# Patient Record
Sex: FEMALE | Race: WHITE
[De-identification: names, ages, dates, MRNs, and addresses within clinical notes are randomized per-mention and may not be internally consistent; named-entity substitution may affect disease eponyms.]

---

## 2017-01-25 ENCOUNTER — HOSPITAL ENCOUNTER (EMERGENCY)
Dept: HOSPITAL 74 - JER | Age: 62
Discharge: HOME | End: 2017-01-25
Payer: COMMERCIAL

## 2017-01-25 VITALS — DIASTOLIC BLOOD PRESSURE: 80 MMHG | TEMPERATURE: 98 F | SYSTOLIC BLOOD PRESSURE: 140 MMHG

## 2017-01-25 VITALS — HEART RATE: 81 BPM

## 2017-01-25 VITALS — BODY MASS INDEX: 24.7 KG/M2

## 2017-01-25 DIAGNOSIS — E78.00: ICD-10-CM

## 2017-01-25 DIAGNOSIS — I10: ICD-10-CM

## 2017-01-25 DIAGNOSIS — J45.909: ICD-10-CM

## 2017-01-25 DIAGNOSIS — N20.0: Primary | ICD-10-CM

## 2017-01-25 DIAGNOSIS — Z87.442: ICD-10-CM

## 2017-01-25 DIAGNOSIS — K52.9: ICD-10-CM

## 2017-01-25 LAB
ALBUMIN SERPL-MCNC: 3.8 G/DL (ref 3.4–5)
ALP SERPL-CCNC: 111 U/L (ref 45–117)
ALT SERPL-CCNC: 117 U/L (ref 12–78)
ANION GAP SERPL CALC-SCNC: 7 MMOL/L (ref 8–16)
APPEARANCE UR: CLEAR
AST SERPL-CCNC: 109 U/L (ref 15–37)
BACTERIA #/AREA URNS HPF: (no result) /HPF
BASOPHILS # BLD: 0.8 % (ref 0–2)
BILIRUB SERPL-MCNC: 0.5 MG/DL (ref 0.2–1)
BILIRUB UR STRIP.AUTO-MCNC: NEGATIVE MG/DL
CALCIUM SERPL-MCNC: 9.2 MG/DL (ref 8.5–10.1)
CO2 SERPL-SCNC: 28 MMOL/L (ref 21–32)
COLOR UR: (no result)
CREAT SERPL-MCNC: 0.7 MG/DL (ref 0.55–1.02)
DEPRECATED RDW RBC AUTO: 13.5 % (ref 11.6–15.6)
EOSINOPHIL # BLD: 1.5 % (ref 0–4.5)
GLUCOSE SERPL-MCNC: 86 MG/DL (ref 74–106)
KETONES UR QL STRIP: NEGATIVE
LEUKOCYTE ESTERASE UR QL STRIP.AUTO: (no result)
MCH RBC QN AUTO: 26.8 PG (ref 25.7–33.7)
MCHC RBC AUTO-ENTMCNC: 32.8 G/DL (ref 32–36)
MCV RBC: 81.9 FL (ref 80–96)
NEUTROPHILS # BLD: 73 % (ref 42.8–82.8)
NITRITE UR QL STRIP: NEGATIVE
PH UR: 7 [PH] (ref 5–8)
PLATELET # BLD AUTO: 242 K/MM3 (ref 134–434)
PMV BLD: 9 FL (ref 7.5–11.1)
PROT SERPL-MCNC: 7.5 G/DL (ref 6.4–8.2)
PROT UR QL STRIP: NEGATIVE
PROT UR QL STRIP: NEGATIVE
RBC # BLD AUTO: 2 /HPF (ref 0–3)
RBC # UR STRIP: (no result) /UL
SP GR UR: 1 (ref 1–1.03)
UROBILINOGEN UR STRIP-MCNC: NEGATIVE E.U./DL (ref 0.2–1)
WBC # BLD AUTO: 9.3 K/MM3 (ref 4–10)
WBC # UR AUTO: 201 /HPF (ref 3–5)

## 2017-01-25 PROCEDURE — 3E0337Z INTRODUCTION OF ELECTROLYTIC AND WATER BALANCE SUBSTANCE INTO PERIPHERAL VEIN, PERCUTANEOUS APPROACH: ICD-10-PCS

## 2017-01-25 PROCEDURE — 3E03329 INTRODUCTION OF OTHER ANTI-INFECTIVE INTO PERIPHERAL VEIN, PERCUTANEOUS APPROACH: ICD-10-PCS

## 2017-01-25 PROCEDURE — 3E0333Z INTRODUCTION OF ANTI-INFLAMMATORY INTO PERIPHERAL VEIN, PERCUTANEOUS APPROACH: ICD-10-PCS

## 2017-01-25 PROCEDURE — 3E033NZ INTRODUCTION OF ANALGESICS, HYPNOTICS, SEDATIVES INTO PERIPHERAL VEIN, PERCUTANEOUS APPROACH: ICD-10-PCS

## 2017-01-25 NOTE — PDOC
History of Present Illness





- General


History Source: Patient, Old Records


Exam Limitations: No Limitations





- History of Present Illness


Initial Comments: 


01/25/17 11:31


The patient is a 61-year-old woman, with a significant past medical history of 

hypertension, hypercholesterolemia, gastritis, recurrent urinary tract 

infections, kidney stones (recent outpatient CT scan showed bilateral kidney 

stones (12/22/2016) and asthma who presents to the emergency department for 

further evaluation of right flank pain. No trauma. No recent strenuous 

activity. She reports she was in her usual state of health when she was at home 

drinking water, coincidentally performing a home 24 hour urine sample when she 

suddenly experienced right flank pain. She states that her pain has been 

constant since onset, and has intensified throughout onset (8/10) in severity. 

She reports associated symptoms of dysuria, described as burning sensations, 

urinary frequency(output of a few drops) and hematuria (no clots noted). She 

admits to a history of urinary tract infections and kidney stones s/p kidney 

stone removal in (1986) and reports her symptoms are consistent with her 

typical UTIs. She took a Tylenol 3 tablet this morning, without much relief.





She denies fever, chills, diaphoresis, generalized weakness.


She denies chest pain, shortness of breath, cough


She denies abdominal pain, nausea, vomiting, diarrhea, vaginal discharge/

vaginal bleeding





Allergies: Morphine. Apples Carrots. Strawberries. 


Past Surgical History: Appendectomy. CHolecystectomy. Hernia repair. 


Social History: No tobacco, ETOH and recreational drug use. 


Primary Care Physician: Christopher Fuller [phone] 


Urologist: Jose Paniagua  [phone]





<Jaymie Choe - Last Filed: 01/25/17 14:01>





<Ryan Tinajero - Last Filed: 01/25/17 14:27>





- General


Chief Complaint: Pain


Stated Complaint: LOWER PELVIC PAIN, BLOOD IN URINE


Time Seen by Provider: 01/25/17 11:00





Past History





<Jaymie Choe - Last Filed: 01/25/17 14:01>





- Past Medical History


Anemia: No


Asthma: Yes


Cancer: No


Cardiac Disorders: Yes


CVA: No


COPD: No


CHF: No


Dementia: No


Diabetes: No


GI Disorders: Yes (gastritis)


 Disorders: Yes (X5 stones)


HTN: Yes


Hypercholesterolemia: Yes


Kidney Stones: Yes


Suicide Attempt (Hx): No


Seizures: No


Thyroid Disease: Yes





- Surgical History


Abdominal Surgery: Yes (HERNIA)


Appendectomy: Yes


Cardiac Surgery: No


Cholecystectomy: Yes


Lung Surgery: No


Neurologic Surgery: No


Orthopedic Surgery: No





- Reproductive History


Cervical CA: No


Dysfunctional Uterine Bleeding: No


Ectopic Pregnancy: No


Endometrial CA: No


Polycystic Ovaries: No


Tubal Ligation: No





- Psycho/Social/Smoking Cessation Hx


Anxiety: No


Suicidal Ideation: No


Smoking History: Never smoked


Have you smoked in the past 12 months: No


Hx Alcohol Use: No


Drug/Substance Use Hx: No


Substance Use Type: None





<Ryan Tinajero - Last Filed: 01/25/17 14:27>





- Past Medical History


Allergies/Adverse Reactions: 


 Allergies











Allergy/AdvReac Type Severity Reaction Status Date / Time


 


morphine Allergy Intermediate Itching Verified 01/25/17 10:45


 


APPLES Allergy Severe  Uncoded 01/25/17 10:45


 


CARROTS Allergy Severe  Uncoded 01/25/17 10:45


 


STRAWBERRIES Allergy Severe  Uncoded 01/25/17 10:45











Home Medications: 


Ambulatory Orders





Duloxetine HCl [Cymbalta -] 60 mg PO DAILY 01/22/14 


Omeprazole [Prilosec (RX)] 40 mg PO BID 12/29/15 


Cephalexin Monohydrate [Keflex -] 500 mg PO BID #14 capsule 01/25/17 











**Review of Systems





- Review of Systems


Constitutional: No: Chills, Fever


Respiratory: No: Cough, Shortness of Breath


Cardiac (ROS): No: Chest Pain


ABD/GI: No: Diarrhea, Nausea, Vomiting


: Yes: See HPI, Burning, Dysuria, Flank Pain, Hematuria


All Other Systems: Reviewed and Negative





<Ryan Tinajero - Last Filed: 01/25/17 14:27>





*Physical Exam





- Vital Signs


 Last Vital Signs











Temp Pulse Resp BP Pulse Ox


 


 98.0 F   81   20   146/86   100 


 


 01/25/17 10:42  01/25/17 10:42  01/25/17 10:42  01/25/17 10:42  01/25/17 10:42














- Physical Exam


Comments: 


01/25/17 11:32


GENERAL: The patient is awake, alert, and fully oriented, in no acute distress.


HEAD: Normal with no signs of trauma.


EYES: Pupils equal, round and reactive to light, extraocular movements intact, 

sclera 


anicteric, conjunctiva clear with no pallor.


ENT: Ears normal, nares patent, oropharynx clear without exudates.  Moist 

mucous membranes.


NECK: Normal range of motion, supple without lymphadenopathy, JVD, or masses.


LUNGS: Breath sounds equal, clear to auscultation bilaterally.  No wheeze/

crackles.


HEART: Regular rate and rhythm, normal S1 and S2 without murmur or rub.


ABDOMEN: Soft, diffuse lower abdominal discomfort to palpation, nondistended. 

BS wnl.  No guarding or rebound.  No palpable masses. No hepatosplenomegaly.


BACK: +Right CVA tenderness. 


 EXTREMITIES: Normal range of motion, no edema.  No clubbing or cyanosis. No 

cords, erythema, or tenderness.


NEUROLOGICAL: Cranial nerves II through XII grossly intact.  Normal speech.


PSYCH: Normal mood, normal affect.


SKIN: Warm, Dry, normal turgor, no rashes or lesions noted.





<Jaymie Choe - Last Filed: 01/25/17 14:01>





- Vital Signs


 Last Vital Signs











Temp Pulse Resp BP Pulse Ox


 


 98.0 F   81   20   146/86   100 


 


 01/25/17 10:42  01/25/17 10:42  01/25/17 10:42  01/25/17 10:42  01/25/17 10:42














<Ryan Tinajero - Last Filed: 01/25/17 14:27>





ED Treatment Course





- LABORATORY


CBC & Chemistry Diagram: 


 01/25/17 11:40





 01/25/17 11:40





- RADIOLOGY


Radiograph Interpretation: 


01/25/17 13:24


EXAM: CT/ABDOMEN PELVIS CT W/O CONTR CT 


IMPRESSION: Axial imaging completed without intravenous and oral contrast with 

normal lung bases. Cholecystectomy clips noted Normal visualization region of 

pancreas with no biliary dilation identified No retained stones imaged Symmetry 

of both kidneys with nonobstructive nephrolithiasis observed in the left 

kidney. Hyperdense cyst or mass interpolar region left kidney which must be 

correlated with a renal sonogram. Previous CT scan reviewed from December 2016. 

Mild hydroureteronephrosis on the right side noted with a 0.3 cm stone lower 

pole calyx right kidney. Right hydroureter observed with traced ureter to the 

level of the pelvis accounting for atherosclerotic calcifications in the 

vessel. Calcified phleboliths are seen in the pelvis. No stones are seen in the 

region of distal right or left ureter. No stones are imaged in the urinary 

bladder. 





<Jaymie Choe - Last Filed: 01/25/17 14:01>





- LABORATORY


CBC & Chemistry Diagram: 


 01/25/17 11:40





 01/25/17 11:40





- RADIOLOGY


Radiology Studies Ordered: 














 Category Date Time Status


 


 ABDOMEN & PELVIS CT W/O CONTR [CT] Stat CT Scan  01/25/17 11:21 Ordered














<MaicoiaRyan - Last Filed: 01/25/17 14:27>





Medical Decision Making





- Medical Decision Making


01/25/17 13:22


Overhead page to patient's Urologist, Dr. Jose Paniagua. 





01/25/17 13:25


Page sent out Dr. Paniagua at [phone]. 





01/25/17 14:01


Second page sent out Dr. Paniagua at [phone]. 





<Jaymie Choe - Last Filed: 01/25/17 14:01>





- Medical Decision Making


01/25/17 11:26


A portion of this note was documented by scribe services under my direction. I 

have reviewed the details of the note, within reason, and agree with the 

documentation with the following case summary and management plan written by me.





61-year-old female with history of kidney stones and UTI presents with right 

flank pain since last night associated with urinary frequency/dribbling/

hematuria. No fevers or chills.





Afebrile here.


Mild discomfort


Positive right CVA tenderness, positive lower abdominal tenderness





61-year-old female presents with symptoms that seem most consistent with 

cystitis, rule out superimposed obstructing stone given her history. Well-

appearing without evidence of sepsis.


Labs, urinalysis


Pain control


CT of the abdomen and pelvis


Reassess and dispo, is followed by Dr. Barfield of urology.





01/25/17 13:07


No leukocytosis, chemistries within normal limits. Urinalysis with elevated 

white blood cells consistent with UTI, CTAP consistent with recently passed 

stone on the right. Above is consistent with patient's presentation, given no 

persistently obstructing stone, can be discharged on abx and pain control. 





01/25/17 14:15


Patient comfortable, ambulating independently in the ED. Case discussed with 

Dr. Paniagua, agrees patient can be discharged on antibiotics and follow-

up with him in the office. Patient agrees with plan, understands return 

criteria.





<Ryan Tinajero - Last Filed: 01/25/17 14:27>





*DC/Admit/Observation/Transfer





- Attestations


Scribe Attestion: 


01/25/17 11:32


Documentation prepared by Jaymie Choe, acting as medical scribe for 

Ryan Tinajero MD.





<Jaymie Choe - Last Filed: 01/25/17 14:01>





<Ryan Tinajero - Last Filed: 01/25/17 14:27>


Diagnosis at time of Disposition: 


 Flank pain, Cystitis, Kidney stone on right side





- Discharge Dispostion


Disposition: HOME


Condition at time of disposition: Improved





- Prescriptions


Prescriptions: 


Cephalexin Monohydrate [Keflex -] 500 mg PO BID #14 capsule





- Referrals


Referrals: 


Christopher Fuller MD [Primary Care Provider] - 


Jose Paniagua MD [Staff Physician] - 





- Patient Instructions


Printed Discharge Instructions:  DI for Urinary Tract Infection (UTI), DI for 

Kidney Stones


Additional Instructions: 


Activity as tolerated. Stay hydrated. 





A urine test shows evidence of infection, take Keflex as prescribed for one 

week.


Blood tests showed no acute abnormalities, but a CAT scan showed that you've 

likely passed a stone on the right side.





Tylenol 1000 mg every 8 hours and/or ibuprofen 600 mg every 8 hours as needed 

for pain.





Continue your medications as previously prescribed by your physician.





You should follow up with Dr. Guerrero doctor as soon as possible regarding 

today's emergency department visit.





Return to the emergency department for any new or concerning symptoms, 

particularly persistent or worsening pain, fevers or chills, difficulty 

urinating.

## 2017-01-26 NOTE — PDOC
*Physical Exam





- Vital Signs


 Last Vital Signs











Temp Pulse Resp BP Pulse Ox


 


 98 F   81   17   140/80   97 


 


 01/25/17 14:45  01/25/17 14:45  01/25/17 14:45  01/25/17 14:45  01/25/17 14:45














ED Treatment Course





- LABORATORY


CBC & Chemistry Diagram: 


 01/25/17 11:40





 01/25/17 11:40





- ADDITIONAL ORDERS


Additional order review: 














 01/25/17 11:19 Urine Culture - Preliminary





 Urine - Urine Clean Catch    Lactose Fermenting Neg Bacilli








 











  01/25/17





  11:40


 


RBC  4.75


 


MCV  81.9


 


MCHC  32.8


 


RDW  13.5


 


MPV  9.0


 


Neutrophils %  73.0


 


Lymphocytes %  19.4  D


 


Monocytes %  5.3


 


Eosinophils %  1.5


 


Basophils %  0.8














- Medications


Given in the ED: 


ED Medications














Discontinued Medications














Generic Name Dose Route Start Last Admin





  Trade Name Freq  PRN Reason Stop Dose Admin


 


Hydromorphone HCl  0.5 mg 01/25/17 13:07 01/25/17 13:15





  Dilaudid Injection -  IVPUSH 01/25/17 13:08  0.5 mg





  ONCE ONE   Administration


 


Sodium Chloride  1,000 mls @ 1,000 mls/hr 01/25/17 11:20 01/25/17 11:38





  Normal Saline -  IV 01/25/17 12:19  1,000 mls/hr





  ONCE ONE   Administration


 


Ceftriaxone Sodium 1 gm/  50 mls @ 100 mls/hr 01/25/17 12:17 01/25/17 13:07





  Dextrose  IVPB 01/25/17 12:46  100 mls/hr





  ONCE ONE   Administration


 


Ketorolac Tromethamine  30 mg 01/25/17 11:20 01/25/17 11:52





  Toradol Injection -  IVPUSH 01/25/17 11:21  30 mg





  ONCE ONE   Administration














*DC/Admit/Observation/Transfer


Diagnosis at time of Disposition: 


 Flank pain, Cystitis, Kidney stone on right side





- Discharge Dispostion


Disposition: HOME


Condition at time of disposition: Improved





- Prescriptions


Prescriptions: 


Cephalexin Monohydrate [Keflex -] 500 mg PO BID #14 capsule





- Referrals


Referrals: 


Christopher Fuller MD [Primary Care Provider] - 


Jose Paniagua MD [Staff Physician] - 





- Patient Instructions


Printed Discharge Instructions:  DI for Kidney Stones, DI for Urinary Tract 

Infection (UTI)


Additional Instructions: 


Activity as tolerated. Stay hydrated. 





A urine test shows evidence of infection, take Keflex as prescribed for one 

week.


Blood tests showed no acute abnormalities, but a CAT scan showed that you've 

likely passed a stone on the right side.





Tylenol 1000 mg every 8 hours and/or ibuprofen 600 mg every 8 hours as needed 

for pain.





Continue your medications as previously prescribed by your physician.





You should follow up with Dr. Guerrero doctor as soon as possible regarding 

today's emergency department visit.





Return to the emergency department for any new or concerning symptoms, 

particularly persistent or worsening pain, fevers or chills, difficulty 

urinating. 





- Post Discharge Activity

## 2017-01-27 NOTE — PDOC
Patient Follow-up (Call Back)





- Post ED Follow - Up


Chief Complaint: Urinary Problem


Condition at time of discharge: Improved


Disposition at time of original discharge: HOME





- Disposition


Additional Instructions/Notes: 


Patient's urine culture shows positive for Escherichia coli which may be 

resistant to cephalosporins. I called patient at home and states was unable to 

get the prescription since it was not sent to the right pharmacy. I explained 

to patient that I sent it to [facility] and patient is to begin 

Macrobid today. Patient fully understands and I also gave patient the fast 

track number if she has any difficulty with her prescription today.

## 2018-04-09 ENCOUNTER — HOSPITAL ENCOUNTER (INPATIENT)
Dept: HOSPITAL 74 - JER | Age: 63
LOS: 2 days | Discharge: HOME | DRG: 463 | End: 2018-04-11
Attending: INTERNAL MEDICINE | Admitting: INTERNAL MEDICINE
Payer: COMMERCIAL

## 2018-04-09 VITALS — BODY MASS INDEX: 28 KG/M2

## 2018-04-09 DIAGNOSIS — I10: ICD-10-CM

## 2018-04-09 DIAGNOSIS — Z87.891: ICD-10-CM

## 2018-04-09 DIAGNOSIS — E78.00: ICD-10-CM

## 2018-04-09 DIAGNOSIS — F41.8: ICD-10-CM

## 2018-04-09 DIAGNOSIS — K59.00: ICD-10-CM

## 2018-04-09 DIAGNOSIS — Z91.09: ICD-10-CM

## 2018-04-09 DIAGNOSIS — N20.0: ICD-10-CM

## 2018-04-09 DIAGNOSIS — E05.90: ICD-10-CM

## 2018-04-09 DIAGNOSIS — D35.00: ICD-10-CM

## 2018-04-09 DIAGNOSIS — J45.909: ICD-10-CM

## 2018-04-09 DIAGNOSIS — N10: Primary | ICD-10-CM

## 2018-04-09 DIAGNOSIS — R11.0: ICD-10-CM

## 2018-04-09 DIAGNOSIS — N39.0: ICD-10-CM

## 2018-04-09 DIAGNOSIS — B96.20: ICD-10-CM

## 2018-04-09 LAB
ALBUMIN SERPL-MCNC: 3.7 G/DL (ref 3.4–5)
ALP SERPL-CCNC: 92 U/L (ref 45–117)
ALT SERPL-CCNC: 29 U/L (ref 12–78)
ANION GAP SERPL CALC-SCNC: 4 MMOL/L (ref 8–16)
APPEARANCE UR: CLEAR
AST SERPL-CCNC: 23 U/L (ref 15–37)
BACTERIA #/AREA URNS HPF: (no result) /HPF
BASOPHILS # BLD: 0.9 % (ref 0–2)
BILIRUB SERPL-MCNC: 0.2 MG/DL (ref 0.2–1)
BILIRUB UR STRIP.AUTO-MCNC: NEGATIVE MG/DL
BUN SERPL-MCNC: 14 MG/DL (ref 7–18)
CALCIUM SERPL-MCNC: 9.1 MG/DL (ref 8.5–10.1)
CHLORIDE SERPL-SCNC: 104 MMOL/L (ref 98–107)
CO2 SERPL-SCNC: 30 MMOL/L (ref 21–32)
COLOR UR: (no result)
CREAT SERPL-MCNC: 0.7 MG/DL (ref 0.55–1.02)
DEPRECATED RDW RBC AUTO: 14.2 % (ref 11.6–15.6)
EOSINOPHIL # BLD: 3 % (ref 0–4.5)
EPITH CASTS URNS QL MICRO: (no result) /HPF
GLUCOSE SERPL-MCNC: 98 MG/DL (ref 74–106)
HCT VFR BLD CALC: 42.4 % (ref 32.4–45.2)
HGB BLD-MCNC: 13.9 GM/DL (ref 10.7–15.3)
KETONES UR QL STRIP: NEGATIVE
LEUKOCYTE ESTERASE UR QL STRIP.AUTO: (no result)
LYMPHOCYTES # BLD: 36.8 % (ref 8–40)
MCH RBC QN AUTO: 26.6 PG (ref 25.7–33.7)
MCHC RBC AUTO-ENTMCNC: 32.9 G/DL (ref 32–36)
MCV RBC: 81 FL (ref 80–96)
MONOCYTES # BLD AUTO: 6.8 % (ref 3.8–10.2)
MUCOUS THREADS URNS QL MICRO: (no result)
NEUTROPHILS # BLD: 52.5 % (ref 42.8–82.8)
NITRITE UR QL STRIP: NEGATIVE
PH UR: 8 [PH] (ref 5–8)
PLATELET # BLD AUTO: 234 K/MM3 (ref 134–434)
PMV BLD: 8.7 FL (ref 7.5–11.1)
POTASSIUM SERPLBLD-SCNC: 4.9 MMOL/L (ref 3.5–5.1)
PROT SERPL-MCNC: 7.7 G/DL (ref 6.4–8.2)
PROT UR QL STRIP: NEGATIVE
PROT UR QL STRIP: NEGATIVE
RBC # BLD AUTO: 5.23 M/MM3 (ref 3.6–5.2)
RBC # UR STRIP: NEGATIVE /UL
SODIUM SERPL-SCNC: 138 MMOL/L (ref 136–145)
SP GR UR: 1.01 (ref 1–1.03)
UROBILINOGEN UR STRIP-MCNC: NEGATIVE MG/DL (ref 0.2–1)
WBC # BLD AUTO: 4.1 K/MM3 (ref 4–10)

## 2018-04-09 RX ADMIN — ATORVASTATIN CALCIUM SCH: 20 TABLET, FILM COATED ORAL at 21:57

## 2018-04-09 RX ADMIN — ATORVASTATIN CALCIUM SCH MG: 20 TABLET, FILM COATED ORAL at 21:53

## 2018-04-09 RX ADMIN — HEPARIN SODIUM SCH: 5000 INJECTION, SOLUTION INTRAVENOUS; SUBCUTANEOUS at 21:58

## 2018-04-09 RX ADMIN — ARIPIPRAZOLE SCH MG: 5 TABLET ORAL at 21:53

## 2018-04-09 RX ADMIN — HEPARIN SODIUM SCH UNIT: 5000 INJECTION, SOLUTION INTRAVENOUS; SUBCUTANEOUS at 21:53

## 2018-04-09 NOTE — HP
CHIEF COMPLAINT: " Right sided flank pain, burning urination "





PCP: Christopher Fuller


Nephrologist: Dr. NORA Valencia





HISTORY OF PRESENT ILLNESS:





Patient is a 62 year old female presented to the ED with the chief complaints 

of "  Right sided flank pain, burning urination " x 3 weeks. As per the patient

, she has had frequent h/o UTI's, Pyelonephritis and Kidney stones; last pyelo 

was 2/22/17-was admitted at [facility] was treated for ESBL with Ertapenam. 


Patient started having fever at home (not measured), chills, right sided flank 

pain 10/10, non radiating, intermittent, relieved with tylenol +codeine. 


Pain was also associated with nausea, dysuria, frequent urination but no 

hematuria or urinary incontinence. Pain progressively got worse hence came in 

to the ED for further evaluation. 


Denies headache, trauma, loc, sick contacts, abdominal pain, vomiting. Bowel 

habit normal. 


Sleep normal. Appetite decreased. 





ER course was notable for:


(1) Afebrile, hemodynamically stable, no leukocytosis. UA: 2 + Leukocyte 

esterase, 30 WBC


(2) No imaging


(3) IV Nacl; Ketorolac, Ertapenam. 





Recent Travel: None





PAST MEDICAL HISTORY: Kidney stones since 30yrs; Hypertension, Hyperlipidemia, 

Anxiety, Hyperparathyroidism s/p parathyroidectomy





PAST SURGICAL HISTORY:  Hysterectomy; Hernia repair; Cholecystectomy; 

Appendectomy; Tonsillectomy; Left cataract; Hyperparathyroidism s/p 

parathyroidectomy





Social History:


Smoking: Quit 15 years ago


Alcohol: Denies


Drugs: Denies








Family History: Non contributory 





Allergies





morphine Allergy (Intermediate, Verified 04/09/18 08:48)


 Itching


Sulfa (Sulfonamide Antibiotics) Allergy (Mild, Verified 04/09/18 08:48)


 


APPLES Allergy (Severe, Uncoded 04/09/18 08:48)


 


 "THROAT CLOSES" 


CARROTS Allergy (Severe, Uncoded 04/09/18 08:48)


 


 THROAT CLOSES 


STRAWBERRIES Allergy (Severe, Uncoded 04/09/18 08:48)


 


 "THROAT CLOSES" 








HOME MEDICATIONS:


 Home Medications











 Medication  Instructions  Recorded


 


Unobtainable [Unobtainable]  04/09/18








REVIEW OF SYSTEMS


CONSTITUTIONAL: 


Present: fever, chills


Absent:   diaphoresis, generalized weakness, malaise, loss of appetite, weight 

change


HEENT: 


Absent:  rhinorrhea, nasal congestion, throat pain, throat swelling, difficulty 

swallowing, mouth swelling, ear pain, eye pain, visual changes


CARDIOVASCULAR: 


Absent: chest pain, syncope, palpitations, irregular heart rate, lightheadedness

, peripheral edema


RESPIRATORY: 


Absent: cough, shortness of breath, dyspnea with exertion, orthopnea, wheezing, 

stridor, hemoptysis


GASTROINTESTINAL:


Present: nausea


Absent: abdominal pain, abdominal distension, vomiting, diarrhea, constipation, 

melena, hematochezia


GENITOURINARY: 


Present: dysuria, frequency, flank pain


Absent: urgency, hesitancy, hematuria,  genital pain


MUSCULOSKELETAL: 


Absent: myalgia, arthralgia, joint swelling, back pain, neck pain


SKIN: 


Absent: rash, itching, pallor


HEMATOLOGIC/IMMUNOLOGIC: 


Absent: easy bleeding, easy bruising, lymphadenopathy, frequent infections


ENDOCRINE:


Absent: unexplained weight gain, unexplained weight loss, heat intolerance, 

cold intolerance


NEUROLOGIC: 


Absent: headache, focal weakness or paresthesias, dizziness, unsteady gait, 

seizure, mental status changes, bladder or bowel incontinence


PSYCHIATRIC: 


Absent: anxiety, depression, suicidal or homicidal ideation, hallucinations.








PHYSICAL EXAMINATION


 Vital Signs - 24 hr











  04/09/18





  08:48


 


Temperature 98.2 F


 


Pulse Rate 70


 


Respiratory 18





Rate 


 


Blood Pressure 128/77


 


O2 Sat by Pulse 100





Oximetry (%) 











GENERAL: Patient is comfortably sitting in a chair, Awake, alert, and fully 

oriented, in no acute distress.


HEAD: Normal with no signs of trauma.


EYES: EOM intact, no pallor or icterus. 


EARS, NOSE, THROAT: Ears normal. Moist mucous membranes.


NECK: Supple. 


LUNGS: B/L lungs clear, No wheezes, and no crackles. No accessory muscle use.


HEART: Regular rate and rhythm, normal S1 and S2 without murmur.


ABDOMEN: Soft, nontender, not distended, normoactive bowel sounds, no guarding, 

no rebound, no masses.  No hepatomegaly or  splenomegaly. 


MUSCULOSKELETAL: Normal range of motion at all joints. No bony deformities or 

tenderness. Right CVA tenderness.


UPPER EXTREMITIES: 2+ pulses, warm, well-perfused. No cyanosis. No clubbing. No 

peripheral edema.


LOWER EXTREMITIES: 2+ pulses, warm, well-perfused. No calf tenderness. No 

peripheral edema. 


NEUROLOGICAL:  Normal speech. Normal gait.


PSYCHIATRIC: Cooperative. Good eye contact. Appropriate mood and affect.


SKIN: Warm, dry, normal turgor, no rashes or lesions noted, normal capillary 

refill. 





 Laboratory Results - last 24 hr











  04/09/18 04/09/18 04/09/18





  09:38 09:38 10:15


 


WBC  4.1  


 


RBC  5.23 H  


 


Hgb  13.9  


 


Hct  42.4  


 


MCV  81.0  


 


MCH  26.6  


 


MCHC  32.9  


 


RDW  14.2  


 


Plt Count  234  


 


MPV  8.7  


 


Neutrophils %  52.5  


 


Lymphocytes %  36.8  D  


 


Monocytes %  6.8  


 


Eosinophils %  3.0  


 


Basophils %  0.9  


 


Sodium   138 


 


Potassium   4.9 


 


Chloride   104 


 


Carbon Dioxide   30 


 


Anion Gap   4 L 


 


BUN   14 


 


Creatinine   0.7 


 


Creat Clearance w eGFR   > 60 


 


Random Glucose   98 


 


Calcium   9.1 


 


Total Bilirubin   0.2  D 


 


AST   23 


 


ALT   29 


 


Alkaline Phosphatase   92 


 


Total Protein   7.7 


 


Albumin   3.7 


 


Urine Color    Ltyellow


 


Urine Appearance    Clear


 


Urine pH    8.0


 


Ur Specific Gravity    1.009


 


Urine Protein    Negative


 


Urine Glucose (UA)    Negative


 


Urine Ketones    Negative


 


Urine Blood    Negative


 


Urine Nitrite    Negative


 


Urine Bilirubin    Negative


 


Urine Urobilinogen    Negative


 


Ur Leukocyte Esterase    2+ H


 


Urine WBC (Auto)    30


 


Urine RBC (Auto)    <1


 


Ur Epithelial Cells    Rare


 


Urine Bacteria    Rare


 


Urine Mucus    Rare


 


Urine Yeast    Np











ASSESSMENT/PLAN:





Patient is a 62 year old female with significant past medical history of Kidney 

stones since 30 yrs; frequent UTI's, Pyelonephritis presented to the ED with 

the chief complaints of "  Right sided flank pain, burning urination " x 3 

weeks.





# Possible Pyelonephritis, H/O ESBL


   H/o frequent pyelonephritis-last pyelo 2/22/17, presents with fever, chills, 

rigors, burning urination, Right sided CVA tenderness


   UA showed 2 + Leukocyte esterase, 30 WBC


   Urine culture pending


   Admit Inpatient for IV antibiotics


   IV NS @ 100mls/hr x 1 bag then 75mls/hr


   IV Ertapenam- ID consult appreciated; has h/o ESBL 2/22/17


   Toradol


   Dr. Paniagua consult requested. Pt last saw him at the office a couple 

of months ago. 


          Change antibiotics as per c/s.


   No imaging done at this time. If she spikes fever, has worsening flank pain, 

would consider CT abdomen/Pelvis. 


   Last CT abd/Pelvis 6/25/17: 2 cm Right adrenal adenomal. B/L non obstructing 

renal calculi. 





# Hypertension-controlled





# Hyperlipidemia


   


# Anxiety





# Asthma-not in exacerbation





# All medications needs to be confirmed with pharmacy. To be resumed once 

confirmed. 





# FEN


   IV NS @ 100mls/hr then 75 mls.hr


   Electrolytes WNL


   Sodium controlled diet





# Prophylaxis


   For DVT: On Heparin 5000 IU sq TID


   For GI: Not indicated





# Code Status: Full Code





# Dispo: Duration of stay 2-3 days for IV Antibiotics. 





Illness, Investigation and Plan of care explained to the patient. She 

verbalized understanding. 





Case discussed with Dr. Velarde. 


   


   














Visit type





- Emergency Visit


Emergency Visit: Yes


ED Registration Date: 04/09/18


Care time: The patient presented to the Emergency Department on the above date 

and was hospitalized for further evaluation of their emergent condition.





- New Patient


This patient is new to me today: Yes


Date on this admission: 04/09/18





- Critical Care


Critical Care patient: No

## 2018-04-09 NOTE — PDOC
History of Present Illness





<Pura Watkins - Last Filed: 04/09/18 11:37>





- History of Present Illness


Initial Comments: 





04/09/18 09:43


The patient is a 62 year old female with a history of Kidney Stones and 

Pyelonephritis who presents for evaluation of right flank pain and dysuria.  

The patient reports a 3 week history of burning pain on urination with 

associated right sided sharp flank pain that she states is similar to her prior 

kidney stones.  She notes worsening symptoms over the past week prompting her 

presentation to the ED for evaluation.  She endorses some nausea and lower 

abdominal discomfort, but otherwise denies fevers, chills, SOB, chest pain, 

vomiting, or changes with bowel movements.





<Maximino Landis - Last Filed: 04/09/18 14:18>





- General


Chief Complaint: Pain


Stated Complaint: KIDNEY STONE


Time Seen by Provider: 04/09/18 09:10





Past History





<Pura Watkins - Last Filed: 04/09/18 11:37>





- Past Medical History


Anemia: No


Asthma: Yes


Cancer: No


Cardiac Disorders: Yes


CVA: No


COPD: No


CHF: No


DVT: No


Dementia: No


Diabetes: No


GI Disorders: Yes (gastritis)


 Disorders: Yes (X5 stones)


HTN: Yes


Hypercholesterolemia: Yes


Kidney Stones: Yes


Seizures: No


Thyroid Disease: Yes





- Surgical History


Abdominal Surgery: Yes (HERNIA)


Appendectomy: Yes


Cardiac Surgery: No


Cholecystectomy: Yes


Lung Surgery: No


Neurologic Surgery: No


Orthopedic Surgery: No





- Reproductive History


Cervical CA: No


Dysfunctional Uterine Bleeding: No


Ectopic Pregnancy: No


Endometrial CA: No


Polycystic Ovaries: No


Tubal Ligation: No





- Suicide/Smoking/Psychosocial Hx


Smoking History: Never smoked


Have you smoked in the past 12 months: No


Number of Cigarettes Smoked Daily: 0


Information on smoking cessation initiated: No


Hx Alcohol Use: No


Drug/Substance Use Hx: No


Substance Use Type: None


Hx Substance Use Treatment: No





<Maximino Landis - Last Filed: 04/09/18 14:18>





- Past Medical History


Allergies/Adverse Reactions: 


 Allergies











Allergy/AdvReac Type Severity Reaction Status Date / Time


 


morphine Allergy Intermediate Itching Verified 04/09/18 08:48


 


Sulfa (Sulfonamide Allergy Mild  Verified 04/09/18 08:48





Antibiotics)     


 


APPLES Allergy Severe  Uncoded 04/09/18 08:48


 


CARROTS Allergy Severe  Uncoded 04/09/18 08:48


 


STRAWBERRIES Allergy Severe  Uncoded 04/09/18 08:48











Home Medications: 


Ambulatory Orders





Unobtainable [Unobtainable]  04/09/18 











**Review of Systems





- Review of Systems


Comments:: 





04/09/18 09:45


Constitutional: No fevers, chills, fatigue, malaise


HEENT: No Rhinorrhea, nasal congestion, visual changes


Cardiovascular: No chest pain, syncope, palpitations, lightheadedness


Respiratory: No Cough, SOB, Hemoptysis,


Gastrointestinal: Lower abdominal discomfort, nausea.  No Vomiting, Constipation

, Diarrhea, Melena


Genitourinary: Dysuria, Right sided flank pain. No Frequency, Urgency, Hesitancy

, Hematuria,


Musculoskeletal: No Myalgia, arthralgia


Skin: No rashes, itching, bruising, pallor


Neurologic: No Headache, Dizziness, Numbness, Weakness, or Tingling


Psychiatric: No Hallucinations. No SI or HI








<Maximino Landis - Last Filed: 04/09/18 14:18>





*Physical Exam





- Vital Signs


 Last Vital Signs











Temp Pulse Resp BP Pulse Ox


 


 98.2 F   70   18   128/77   100 


 


 04/09/18 08:48  04/09/18 08:48  04/09/18 08:48  04/09/18 08:48  04/09/18 08:48














<Pura Watkins - Last Filed: 04/09/18 11:37>





- Vital Signs


 Last Vital Signs











Temp Pulse Resp BP Pulse Ox


 


 98.2 F   70   18   128/77   100 


 


 04/09/18 08:48  04/09/18 08:48  04/09/18 08:48  04/09/18 08:48  04/09/18 08:48














- Physical Exam


Comments: 





04/09/18 09:48


General Appearance: Nourished. No Apparent Distress


HEENT: EOMI, RICA. No Pharyngeal Erythema, Tonsillar Exudate, Tonsillar Erythema


Neck: No Cervical Lymphadenopathy


Respiratory/Chest: Lungs Clear, Normal Breath Sounds. No Crackles, Rales, 

Rhonchi, Wheezing


Cardiovascular: Regular Rhythm, Regular Rate. No Murmur, Gallops, Rubs


Gastrointestinal/Abdominal: Normal Bowel Sounds, Soft. Mild suprapubic 

tenderness to palpation.  No Guarding, Rebound,


Musculoskeletal: R CVA Tenderness on exam.  No L CVA Tenderness


Extremity: Normal Capillary Refill


Integumentary: Normal Color, Dry, Warm


Neurologic: Fully Oriented, Alert, Normal Mood/Affect, Normal Response, 





<Maximino Landis - Last Filed: 04/09/18 14:18>





Procedures





- Bedside Ultrasound


Other: Renal US - no hydronephrosis. bladder nondistended





<Pura Watkins - Last Filed: 04/09/18 11:37>





ED Treatment Course





- LABORATORY


CBC & Chemistry Diagram: 


 04/09/18 09:38





 04/09/18 09:38





- ADDITIONAL ORDERS


Additional order review: 


 Laboratory  Results











  04/09/18 04/09/18





  10:15 09:38


 


Sodium   138


 


Potassium   4.9


 


Chloride   104


 


Carbon Dioxide   30


 


Anion Gap   4 L


 


BUN   14


 


Creatinine   0.7


 


Creat Clearance w eGFR   > 60


 


Random Glucose   98


 


Calcium   9.1


 


Total Bilirubin   0.2  D


 


AST   23


 


ALT   29


 


Alkaline Phosphatase   92


 


Total Protein   7.7


 


Albumin   3.7


 


Urine Color  Ltyellow 


 


Urine Appearance  Clear 


 


Urine pH  8.0 


 


Ur Specific Gravity  1.009 


 


Urine Protein  Negative 


 


Urine Glucose (UA)  Negative 


 


Urine Ketones  Negative 


 


Urine Blood  Negative 


 


Urine Nitrite  Negative 


 


Urine Bilirubin  Negative 


 


Urine Urobilinogen  Negative 


 


Ur Leukocyte Esterase  2+ H 


 


Urine WBC (Auto)  30 


 


Urine RBC (Auto)  <1 


 


Ur Epithelial Cells  Rare 


 


Urine Bacteria  Rare 


 


Urine Mucus  Rare 


 


Urine Yeast  Np 








 











  04/09/18





  09:38


 


RBC  5.23 H


 


MCV  81.0


 


MCHC  32.9


 


RDW  14.2


 


MPV  8.7


 


Neutrophils %  52.5


 


Lymphocytes %  36.8  D


 


Monocytes %  6.8


 


Eosinophils %  3.0


 


Basophils %  0.9














- Medications


Given in the ED: 


ED Medications














Discontinued Medications














Generic Name Dose Route Start Last Admin





  Trade Name Jakeq  PRN Reason Stop Dose Admin


 


Sodium Chloride  1,000 mls @ 1,000 mls/hr  04/09/18 09:42  04/09/18 10:00





  Normal Saline -  IV  04/09/18 10:41  1,000 mls/hr





  ASDIR STA   Administration


 


Ketorolac Tromethamine  30 mg  04/09/18 09:42  04/09/18 09:59





  Toradol Injection -  IVPUSH  04/09/18 09:43  30 mg





  ONCE ONE   Administration


 


Ondansetron HCl  4 mg  04/09/18 09:42  04/09/18 10:00





  Zofran Injection  IVPUSH  04/09/18 09:43  4 mg





  ONCE ONE   Administration














<JunePura - Last Filed: 04/09/18 11:37>





- LABORATORY


CBC & Chemistry Diagram: 


 04/09/18 09:38





 04/09/18 09:38





<Maximino Landis - Last Filed: 04/09/18 14:18>





Medical Decision Making





- Medical Decision Making





04/09/18 09:49


The patient is a 62 year old female with a history of Kidney Stones and 

Pyelonephritis who presents for evaluation of right flank pain and dysuria.  

Differential includes but is not limited to: UTI, Pyelonephritis, Kidney Stones

, infections, metabolic derangement.  Given the patient's history of kidney 

stones and physical exam, it is likely her symptoms may be due to a UTI and 

Kidney stone.  We will obtain a cbc, cmp, ua, urine culture to evaluation 

further.  We will treat with toradol, zofran, iv fluids and continue to monitor 

and reassess.





04/09/18 14:14


CBC, cmp are unremarkable.  UA demonstrates positive leuk esterase and wbc to 

30.  Given the patient's UA and physical exam, it is likely her symptoms are 

due to a pyelonephritis.  The patient has a history of ESBL E. Coli from 

previous cultures and will require admission for iv antibiotics.  We discussed 

the case with Dr. Mota who agrees with iv antibiotics and has recommended 

ertapenem.  We discussed the case with the hospitalist team who accepted the 

patient for admission.





<Maximino Landis - Last Filed: 04/09/18 14:18>





*DC/Admit/Observation/Transfer





<JunePura - Last Filed: 04/09/18 11:37>





- Discharge Dispostion


Admit: Yes





<Maximino Landis - Last Filed: 04/09/18 14:18>


Diagnosis at time of Disposition: 


 Pyelonephritis





Urinary tract infection


Qualifiers:


 Urinary tract infection type: site unspecified Hematuria presence: without 

hematuria Qualified Code(s): N39.0 - Urinary tract infection, site not specified








- Discharge Dispostion


Condition at time of disposition: Stable

## 2018-04-09 NOTE — HP
CHIEF COMPLAINT:


right flank pain 





PCP:


Christopher Fuller





HISTORY OF PRESENT ILLNESS:


The patient is a 63 yo f w/ PMH hyperparathryoid s/p resection, recurrent 

kidney stones and pyelonephritis (grew ESBL in the past) who comes to the ED c/

o right sided flank pain. The patient described a 3 week hx of burning on 

urination as well as frequency and incomplete voiding. Her symptoms became 

progressively worse and became associated with a sharp, 10/10 intermittent 

right sided flank pain which did not radiate. The patient took 1/2 of a Tylenol 

#3 at home, which she said improved her symptoms. The patient denies any 

exacerbating factors. The pain got worse and became associated with nausea and 

chills, prompting her to come to the ER for evaluation. The patient states that 

her current symptoms were identical to her previous episodes of pyelonephritis. 

Patient  denies fever, abdominal pain, chest pain, SOB, recent travel or sick 

contacts. 





ER course was notable for:


(1) toradol and zofran


(2) ertapenem, 1L NS 


(3) UA w/ 3+leuk esterase, 30 wbc and rare bacteria





Recent Travel:


none





PAST MEDICAL HISTORY:


Asthma, 


Hyperparathryoidism, 


HTN


Kidney stones


Pyelonephritis





PAST SURGICAL HISTORY:


Hernia repair


appendectomy


cholecytectomy


parahyroid resecion x3





Social History:


Smoking: denies


Alcohol: denies


Drugs: denies





Family History:


non-contributory 





Allergies





morphine Allergy (Intermediate, Verified 04/09/18 08:48)


 Itching


Sulfa (Sulfonamide Antibiotics) Allergy (Mild, Verified 04/09/18 08:48)


 


APPLES Allergy (Severe, Uncoded 04/09/18 08:48)


 


 "THROAT CLOSES" 


CARROTS Allergy (Severe, Uncoded 04/09/18 08:48)


 


 THROAT CLOSES 


STRAWBERRIES Allergy (Severe, Uncoded 04/09/18 08:48)


 


 "THROAT CLOSES" 








HOME MEDICATIONS:


 Home Medications











 Medication  Instructions  Recorded


 


Unobtainable [Unobtainable]  04/09/18








REVIEW OF SYSTEMS


CONSTITUTIONAL: 


Absent:  fever, diaphoresis, generalized weakness, malaise, loss of appetite, 

weight change


HEENT: 


Absent:  rhinorrhea, nasal congestion, throat pain, throat swelling, difficulty 

swallowing, mouth swelling, ear pain, eye pain, visual changes


CARDIOVASCULAR: 


Absent: chest pain, syncope, palpitations, irregular heart rate, lightheadedness

, peripheral edema


RESPIRATORY: 


Absent: cough, shortness of breath, dyspnea with exertion, orthopnea, wheezing, 

stridor, hemoptysis


GASTROINTESTINAL:


Absent: abdominal pain, abdominal distension, vomiting, diarrhea, constipation, 

melena, hematochezia


GENITOURINARY: 


Absent:  hematuria,  genital pain


MUSCULOSKELETAL: 


Absent: myalgia, arthralgia, joint swelling, back pain, neck pain


SKIN: 


Absent: rash, itching, pallor


HEMATOLOGIC/IMMUNOLOGIC: 


Absent: easy bleeding, easy bruising, lymphadenopathy, frequent infections


ENDOCRINE:


Absent: unexplained weight gain, unexplained weight loss, heat intolerance, 

cold intolerance


NEUROLOGIC: 


Absent: headache, focal weakness or paresthesias, dizziness, unsteady gait, 

seizure, mental status changes, bladder or bowel incontinence


PSYCHIATRIC: 


Absent: anxiety, depression, suicidal or homicidal ideation, hallucinations.








PHYSICAL EXAMINATION


 Vital Signs - 24 hr











  04/09/18





  08:48


 


Temperature 98.2 F


 


Pulse Rate 70


 


Respiratory 18





Rate 


 


Blood Pressure 128/77


 


O2 Sat by Pulse 100





Oximetry (%) 











GENERAL: Awake, alert, and fully oriented, in no acute distress.


HEAD: Normal with no signs of trauma.


NECK: Normal range of motion, supple without lymphadenopathy, JVD, or masses.


LUNGS: Breath sounds equal, clear to auscultation bilaterally. No wheezes, and 

no crackles. No accessory muscle use.


HEART: Regular rate and rhythm, normal S1 and S2 without murmur, rub or gallop.


ABDOMEN: Soft, nontender, not distended, normoactive bowel sounds, no guarding, 

no rebound, no masses. There is tenderness to palpation at the right CVA. 


UPPER EXTREMITIES: 2+ pulses, warm, well-perfused. No cyanosis. No clubbing. No 

peripheral edema.


LOWER EXTREMITIES: 2+ pulses, warm, well-perfused. No calf tenderness. No 

peripheral edema. 


NEUROLOGICAL:  Cranial nerves II-X intact. Normal speech. Normal gait.


PSYCHIATRIC: Cooperative. Good eye contact. Appropriate mood and affect.


SKIN: Warm, dry, normal turgor, no rashes or lesions noted, normal capillary 

refill. 


 Laboratory Results - last 24 hr











  04/09/18 04/09/18 04/09/18





  09:38 09:38 10:15


 


WBC  4.1  


 


RBC  5.23 H  


 


Hgb  13.9  


 


Hct  42.4  


 


MCV  81.0  


 


MCH  26.6  


 


MCHC  32.9  


 


RDW  14.2  


 


Plt Count  234  


 


MPV  8.7  


 


Neutrophils %  52.5  


 


Lymphocytes %  36.8  D  


 


Monocytes %  6.8  


 


Eosinophils %  3.0  


 


Basophils %  0.9  


 


Sodium   138 


 


Potassium   4.9 


 


Chloride   104 


 


Carbon Dioxide   30 


 


Anion Gap   4 L 


 


BUN   14 


 


Creatinine   0.7 


 


Creat Clearance w eGFR   > 60 


 


Random Glucose   98 


 


Calcium   9.1 


 


Total Bilirubin   0.2  D 


 


AST   23 


 


ALT   29 


 


Alkaline Phosphatase   92 


 


Total Protein   7.7 


 


Albumin   3.7 


 


Urine Color    Ltyellow


 


Urine Appearance    Clear


 


Urine pH    8.0


 


Ur Specific Gravity    1.009


 


Urine Protein    Negative


 


Urine Glucose (UA)    Negative


 


Urine Ketones    Negative


 


Urine Blood    Negative


 


Urine Nitrite    Negative


 


Urine Bilirubin    Negative


 


Urine Urobilinogen    Negative


 


Ur Leukocyte Esterase    2+ H


 


Urine WBC (Auto)    30


 


Urine RBC (Auto)    <1


 


Ur Epithelial Cells    Rare


 


Urine Bacteria    Rare


 


Urine Mucus    Rare


 


Urine Yeast    Np











ASSESSMENT/PLAN:





The patient is a 63 yo f w/ PMH hyperparathyroidism s/p resection, recurrent 

kidney stones and pyelonephritis who is being admitted for the clinical signs 

and symptoms of pyelonephritis. 





#Right flank pain and dysuria 2/2 pyelonephritis. 


   -Positive UA


   -Ucx pending


   -ID consulted; suggest ertapenem


   -Tylenol and toradol for pain control


   -zofran PRN


   -Urology consult


   -NS @ 100 x 1 bag, then at 75


   -s/p 1L NS in ED


   -isolation precautions due to hx ESBL





#HTN


   -C/w home Norvasc 5mg





#asthma


   -PRN nebs


 


#HLD


   -c/w lipitor 20mg HS





#Anxiety


   -c/w home Abilify 5mg HS


   -c/w home xanax .5mg TID


   -c/w home ambien 10mg HS





#Depression 


   -c/w lexapro 20mg daily 





#FEN


   -NS @ 100 for 1 bag, then @75


   -monitor lytes


   -sodium controlled diet





#Prophy


   -Hep SQ 5kU TID





#Dispo


   -admit to med surg





Visit type





- Emergency Visit


Emergency Visit: Yes


ED Registration Date: 04/09/18


Care time: The patient presented to the Emergency Department on the above date 

and was hospitalized for further evaluation of their emergent condition.





- New Patient


This patient is new to me today: Yes


Date on this admission: 04/09/18





- Critical Care


Critical Care patient: No





Hospitalist Screening





- Colonoscopy Questionnaire


Colonoscopy Questionnaire: 





Colonoscopy Questionnaire








-   Patient:


50 - 75 years old and never had a screening colonoscopy: Unknown


History of colon or rectal polyps, or CA: Unknown


History of IBD, Crohn's disease or UC: Unknown


History of abdominal radiation therapy as a child: Unknown





-   Relative:


1 with colon or rectal CA, or polyps at age 60 or younger: Unknown


Colon or rectal CA diagnosed at age 45 or younger: Unknown


Multiple relatives with colon or rectal CA: Unknown





-   Outcome:


Screening Result: Negative Screen

## 2018-04-09 NOTE — PN
Teaching Attending Note


Name of Resident: Shon Funes





ATTENDING PHYSICIAN STATEMENT





I saw and evaluated the patient.


I reviewed the resident's note and discussed the case with the resident.


I agree with the resident's findings and plan as documented.








SUBJECTIVE: Complains right flank pain dysuria difficulty voiding








OBJECTIVE: Right CVAT








ASSESSMENT AND PLAN:


Microbiology





02/22/17 12:00   Urine - Urine Clean Catch   Urine Culture - Final


                              Escherichia Coli Esbl 


01/25/17 11:19   Urine - Urine Clean Catch   Urine Culture - Final


                              Escherichia Coli Esbl 





 Laboratory Tests











  04/09/18 04/09/18 04/09/18





  09:38 09:38 10:15


 


WBC  4.1  


 


Hgb  13.9  


 


Hct  42.4  


 


Plt Count  234  


 


BUN   14 


 


Creatinine   0.7 


 


Ur Leukocyte Esterase    2+ H


 


Urine WBC (Auto)    30


 


Urine RBC (Auto)    <1








Plan Cultures and Ertepenem 1 gram daily  Contact isolation Urology evaluation





Jack ALEGRIA

## 2018-04-09 NOTE — EKG
Test Reason : 

Blood Pressure : ***/*** mmHG

Vent. Rate : 069 BPM     Atrial Rate : 069 BPM

   P-R Int : 170 ms          QRS Dur : 088 ms

    QT Int : 434 ms       P-R-T Axes : 039 034 039 degrees

   QTc Int : 465 ms

 

NORMAL SINUS RHYTHM

NORMAL ECG

WHEN COMPARED WITH ECG OF 23-FEB-2017 20:24,

VENT. RATE HAS DECREASED BY  34 BPM

 

Confirmed by KIM HOPKINS MD ([id number]) on 4/9/2018 11:41:47 PM

 

Referred By: RODOLFO BERGER           Confirmed By:KIM HOPKINS MD

## 2018-04-09 NOTE — PN
Teaching Attending Note


Name of Resident: Bebo De





ATTENDING PHYSICIAN STATEMENT





I saw and evaluated the patient.


I reviewed the resident's note and discussed the case with the resident.


I agree with the resident's findings and plan as documented.








SUBJECTIVE: This is a 62 year old woman with a history of HTN, hyperlipidemia, 

kidney stones, pyelonephritis, ESBL E. coli UTI, depression, anxiety who comes 

to the ED complaining of right flank pain. She reports urinary frequency and 

dysuria x 3 weeks. The symptoms worsened and she developed sharp right flank 

pain. She had improvement with Tylenol #3. She has had nausea and chills but 

denies fever.








OBJECTIVE:


 Vital Signs











 Period  Temp  Pulse  Resp  BP Sys/Negrete  Pulse Ox


 


 Last 24 Hr  98.2 F-98.5 F  69-72    128-134/76-77  100





HEART: S1S2, RRR


LUNGS: Clear


ABDOMEN: Soft, non-tender, non-distended, normal BS, (+) right CVA tenderness


EXTREMITIES: No edema





 Laboratory Tests











  04/09/18 04/09/18 04/09/18





  09:38 09:38 10:15


 


WBC  4.1  


 


RBC  5.23 H  


 


Hgb  13.9  


 


Hct  42.4  


 


MCV  81.0  


 


MCH  26.6  


 


MCHC  32.9  


 


RDW  14.2  


 


Plt Count  234  


 


MPV  8.7  


 


Neutrophils %  52.5  


 


Lymphocytes %  36.8  D  


 


Monocytes %  6.8  


 


Eosinophils %  3.0  


 


Basophils %  0.9  


 


Sodium   138 


 


Potassium   4.9 


 


Chloride   104 


 


Carbon Dioxide   30 


 


Anion Gap   4 L 


 


BUN   14 


 


Creatinine   0.7 


 


Creat Clearance w eGFR   > 60 


 


Random Glucose   98 


 


Calcium   9.1 


 


Total Bilirubin   0.2  D 


 


AST   23 


 


ALT   29 


 


Alkaline Phosphatase   92 


 


Total Protein   7.7 


 


Albumin   3.7 


 


Urine Color    Ltyellow


 


Urine Appearance    Clear


 


Urine pH    8.0


 


Ur Specific Gravity    1.009


 


Urine Protein    Negative


 


Urine Glucose (UA)    Negative


 


Urine Ketones    Negative


 


Urine Blood    Negative


 


Urine Nitrite    Negative


 


Urine Bilirubin    Negative


 


Urine Urobilinogen    Negative


 


Ur Leukocyte Esterase    2+ H


 


Urine WBC (Auto)    30


 


Urine RBC (Auto)    <1


 


Ur Epithelial Cells    Rare


 


Urine Bacteria    Rare


 


Urine Mucus    Rare


 


Urine Yeast    Np








 Home Medications











 Medication  Instructions  Recorded


 


Alprazolam [Xanax] 0.5 mg PO TID 04/09/18


 


Amlodipine Besylate [Norvasc -] 5 mg PO DAILY 04/09/18


 


Aripiprazole [Abilify] 5 mg PO HS 04/09/18


 


Atorvastatin Calcium [Lipitor] 20 mg PO HS 04/09/18


 


Escitalopram Oxalate [Lexapro -] 20 mg PO DAILY 04/09/18


 


Zolpidem Tartrate 10 mg PO HS 04/09/18














ASSESSMENT AND PLAN:


This is a 62 year old woman with a history of HTN, hyperlipidemia, kidney stones

, pyelonephritis, ESBL E. coli UTI, depression, anxiety who presented to the ED 

with right flank pain, dysuria, urinary frequency, nausea,and chills.





1. UTI, possible acute pyelonephritis. 


   - Start Invanz as patient has history of ESBL E. coli


   - Follow up urine culture


2. HTN


   - Continue Norvasc


3. Hyperlipidemia


   - Continue Lipitor


4. Depression/anxiety


   - Continue Lexapro, Abilify, Xanax

## 2018-04-09 NOTE — PDOC
Attending Attestation





- Resident


Resident Name: Maximino Landis





- ED Attending Attestation


I have performed the following: I have examined & evaluated the patient, The 

case was reviewed & discussed with the resident, I agree w/resident's findings 

& plan, Exceptions are as noted





- Medical Decision Making





04/09/18 09:34


61 yo with h/o stone and pyelo here with dysuria and flank pain. plan ua urine 

cultures. labs and bedside ultrasound evaluate for hydronephrosis.


04/09/18 11:35


Focused ED ultrasound renal, indication: Right-sided flank pain rule out 

hydronephrosis


Bilateral kidneys scanned into planes with curvilinear probe


No hydronephrosis noted bilaterally right kidney measured 9.2 cm left kidney 

length 10.2 cm


Bladder scanned into planes no urinary retention bladder volume was 139 mL


Impression: Normal renal ultrasound no hydronephrosis





<Pura Watkins - Last Filed: 04/09/18 11:35>





- HPI


HPI: 





04/09/18 09:35


61 yo F kidney stones, pyelonephritis (resistant with Klebsiella and ESBL), HTN

, HLD who presents with 3 weeks of dysuria associated R sided flank pain. 

Patient reports pain feels similar to prior kidney stones. Last reported 

episode was 1 year ago. Patient endorses nausea and vomiting however denies 

fever or chills.





Allergies: Morphine, Sulfa abx


Past surgical hx: Hysterectomy, Hernia repair,Cholecystectomy,Appendectomy,

Tonsillectomy,Left cataract


Social hx: None


PCP: None








- Physicial Exam


PE: 





04/09/18 09:38


GENERAL: Awake, alert, and fully oriented, in no acute distress


HEAD: No signs of trauma


EYES: PERRLA, EOMI, sclera anicteric, conjunctiva clear


ENT: Auricles normal inspection, nares patent, Moist mucosa


NECK: Normal ROM, supple, no lymphadenopathy, JVD, or masses


LUNGS: Breath sounds equal, clear to auscultation bilaterally.  No wheezes, and 

no crackles


HEART: Regular rate and rhythm, normal S1 and S2, no murmurs, rubs or gallops


ABDOMEN: Soft, nontender, normoactive bowel sounds.  No guarding, no rebound.  

No masses. +Mild suprapubic tenderness


SKIN: Warm, Dry, normal turgor, no rashes or lesions noted.











- Medical Decision Making








04/09/18 09:39





Documentation prepared by Cara Stanton, acting as medical scribe for Pura Watkins MD








<Cara Stanton - Last Filed: 04/09/18 11:38>

## 2018-04-09 NOTE — CONSULT
Consultation: 


REQUESTING PROVIDER:





CONSULT REQUEST: We have been asked to medically evaluate this patient for 

pyelonephritis.





HISTORY OF PRESENT ILLNESS:





Pt is a 63 y/o F with PMH kidney stones, ESBL UTI, and secondary pyelonephritis 

seen at this facility by ID. On that occasion, she was treated with Ertapenem 

and sent home with a PICC and later switched to Nitrofurantoin. Per record, pt 

sees Dr. Spencer who is aware of her issue and previously had thought it not 

to be related to UTI.





On this visit, pt presents with several weeks of Right flank and abdominal pain 

which is sharp and severe. Pain has been worsening over the last week prompting 

her ED visit. Pt denies blood in urine, fever, chills, nausea, vomiting, sick 

contacts. Pt does not work. 





In ED pt had bedside ultrasound done by ED staff. No evidence of hydronephrosis 

at that time. 





REVIEW OF SYSTEMS:


CONSTITUTIONAL: 


Absent:  fever, chills, diaphoresis, generalized weakness, malaise, loss of 

appetite, weight change


HEENT: 


Absent:  rhinorrhea, nasal congestion, throat pain, throat swelling, difficulty 

swallowing, mouth swelling, ear pain, eye pain, visual changes


CARDIOVASCULAR: 


Absent: chest pain, syncope, palpitations, irregular heart rate, lightheadedness

, peripheral edema


RESPIRATORY: 


Absent: cough, shortness of breath, dyspnea with exertion, orthopnea, wheezing, 

stridor, hemoptysis


GASTROINTESTINAL:abdominal pain


Absent: , abdominal distension, nausea, vomiting, diarrhea, constipation, melena

, hematochezia


GENITOURINARY:  flank pain


Absent: dysuria, frequency, urgency, hesitancy, hematuria,, genital pain


MUSCULOSKELETAL: 


Absent: myalgia, arthralgia, joint swelling, back pain, neck pain


SKIN: 


Absent: rash, itching, pallor


HEMATOLOGIC/IMMUNOLOGIC: 


Absent: easy bleeding, easy bruising, lymphadenopathy, frequent infections


ENDOCRINE:


Absent: unexplained weight gain, unexplained weight loss, heat intolerance, 

cold intolerance


NEUROLOGIC: 


Absent: headache, focal weakness or paresthesias, dizziness, unsteady gait, 

seizure, mental status changes, bladder or bowel incontinence


PSYCHIATRIC: 


Absent: anxiety, depression, suicidal or homicidal ideation, hallucinations.





PHYSICAL EXAMINATION





 Vital Signs - 24 hr











  04/09/18





  08:48


 


Temperature 98.2 F


 


Pulse Rate 70


 


Respiratory 18





Rate 


 


Blood Pressure 128/77


 


O2 Sat by Pulse 100





Oximetry (%) 














GENERAL: Awake, alert, and fully oriented, in no acute distress.


HEAD: Normal with no signs of trauma.


EYES: Pupils equal, round and reactive to light, sclera anicteric, conjunctiva 

clear. No lid lag.


EARS, NOSE, THROAT: oropharynx clear without exudates. Moist mucous membranes.


NECK: Normal range of motion, supple without lymphadenopathy, JVD, or masses.


LUNGS: Breath sounds equal, clear to auscultation bilaterally. No wheezes, and 

no crackles. No accessory muscle use.


HEART: Regular rate and rhythm, normal S1 and S2 without murmur, rub or gallop.


ABDOMEN: Soft, RIGHT FLANK PUNCH TENDERNESS, not distended, normoactive bowel 

sounds, no guarding, no rebound, no masses.  No hepatomegaly or splenomegaly. 


MUSCULOSKELETAL: Normal range of motion at all joints. No bony deformities or 

tenderness. No CVA tenderness.


UPPER EXTREMITIES: 2+ pulses, warm, well-perfused. No cyanosis. No clubbing. 

Cap refill <2 seconds. No peripheral edema.


LOWER EXTREMITIES: 2+ pulses, warm, well-perfused. No calf tenderness. No 

peripheral edema. 


NEUROLOGICAL:  Cranial nerves II-XII intact. Normal speech.


PSYCHIATRIC: Cooperative. Good eye contact. Appropriate mood and affect.


SKIN: Warm, dry, normal turgor, no rashes or lesions noted. 











 Laboratory Results - last 24 hr











  04/09/18 04/09/18 04/09/18





  09:38 09:38 10:15


 


WBC  4.1  


 


RBC  5.23 H  


 


Hgb  13.9  


 


Hct  42.4  


 


MCV  81.0  


 


MCH  26.6  


 


MCHC  32.9  


 


RDW  14.2  


 


Plt Count  234  


 


MPV  8.7  


 


Neutrophils %  52.5  


 


Lymphocytes %  36.8  D  


 


Monocytes %  6.8  


 


Eosinophils %  3.0  


 


Basophils %  0.9  


 


Sodium   138 


 


Potassium   4.9 


 


Chloride   104 


 


Carbon Dioxide   30 


 


Anion Gap   4 L 


 


BUN   14 


 


Creatinine   0.7 


 


Creat Clearance w eGFR   > 60 


 


Random Glucose   98 


 


Calcium   9.1 


 


Total Bilirubin   0.2  D 


 


AST   23 


 


ALT   29 


 


Alkaline Phosphatase   92 


 


Total Protein   7.7 


 


Albumin   3.7 


 


Urine Color    Ltyellow


 


Urine Appearance    Clear


 


Urine pH    8.0


 


Ur Specific Gravity    1.009


 


Urine Protein    Negative


 


Urine Glucose (UA)    Negative


 


Urine Ketones    Negative


 


Urine Blood    Negative


 


Urine Nitrite    Negative


 


Urine Bilirubin    Negative


 


Urine Urobilinogen    Negative


 


Ur Leukocyte Esterase    2+ H


 


Urine WBC (Auto)    30


 


Urine RBC (Auto)    <1


 


Ur Epithelial Cells    Rare


 


Urine Bacteria    Rare


 


Urine Mucus    Rare


 


Urine Yeast    Np








Active Medications











Generic Name Dose Route Start Last Admin





  Trade Name Carolynn  PRN Reason Stop Dose Admin


 


Ertapenem  1 gm  04/09/18 12:50  04/09/18 13:41





  Invanz (Pre-Docked)  IVPB  04/09/18 12:51  1 gm





  ONCE ONE   Administration











ASSESSMENT/PLAN:





#Pyelonephritis


-Ertapenem given in ED


-Sulfa allergy


-











Dispo: We will continue to follow the patient. Thank you for this consultative 

opportunity.











Visit type





- Emergency Visit


Emergency Visit: Yes


ED Registration Date: 04/09/18


Care time: The patient presented to the Emergency Department on the above date 

and was hospitalized for further evaluation of their emergent condition.





- New Patient


This patient is new to me today: Yes


Date on this admission: 04/10/18





- Critical Care


Critical Care patient: No

## 2018-04-09 NOTE — CON.GU
Consult





- History of Present Illness


History of Present Illness: 





61 yo female with h/o recurrent uti and nephrolithiasis. Now admitted with fever

, rt flank pain, urgency, dysuria. renal sono without hydro





- Past Medical History


Pulmonary: Yes: Asthma


Renal/: Yes: Renal Calculi, UTI





- Alcohol/Substance Use


Hx Alcohol Use: No





- Smoking History


Smoking history: Never smoked


Have you smoked in the past 12 months: No


Aproximately how many cigarettes per day: 0





- Social History


Usual Living Arrangement: With Child





Home Medications





- Allergies


Allergies/Adverse Reactions: 


 Allergies











Allergy/AdvReac Type Severity Reaction Status Date / Time


 


morphine Allergy Intermediate Itching Verified 04/09/18 08:48


 


Sulfa (Sulfonamide Allergy Mild  Verified 04/09/18 08:48





Antibiotics)     


 


APPLES Allergy Severe  Uncoded 04/09/18 08:48


 


CARROTS Allergy Severe  Uncoded 04/09/18 08:48


 


STRAWBERRIES Allergy Severe  Uncoded 04/09/18 08:48














- Home Medications


Home Medications: 


Ambulatory Orders





Alprazolam [Xanax] 0.5 mg PO TID 04/09/18 


Amlodipine Besylate [Norvasc -] 5 mg PO DAILY 04/09/18 


Aripiprazole [Abilify] 5 mg PO HS 04/09/18 


Atorvastatin Calcium [Lipitor] 20 mg PO HS 04/09/18 


Escitalopram Oxalate [Lexapro -] 20 mg PO DAILY 04/09/18 


Zolpidem Tartrate 10 mg PO HS 04/09/18 











Family Disease History





- Family Disease History


Family Disease History: CA: Grandparent, Father, Mother





Physical Exam-


Vital Signs: 


 Vital Signs











Temperature  98.5 F   04/09/18 15:51


 


Pulse Rate  72   04/09/18 15:51


 


Respiratory Rate  18   04/09/18 15:51


 


Blood Pressure  134/76   04/09/18 15:51


 


O2 Sat by Pulse Oximetry (%)  100   04/09/18 08:48











Renal/: Yes: CVA Tenderness - Right


Labs: 


 CBC, BMP





 04/09/18 09:38 





 04/09/18 09:38 











Imaging





- Results


Ultrasound: Report Reviewed





Problem List





- Problems


(1) Pyelonephritis


Assessment/Plan: 


abx as per ID, CT to r/o obstructing nephrolithiasis


Code(s): N12 - TUBULO-INTERSTITIAL NEPHRITIS, NOT SPCF AS ACUTE OR CHRONIC

## 2018-04-10 LAB
ALBUMIN SERPL-MCNC: 3.3 G/DL (ref 3.4–5)
ALP SERPL-CCNC: 81 U/L (ref 45–117)
ALT SERPL-CCNC: 21 U/L (ref 12–78)
ANION GAP SERPL CALC-SCNC: 5 MMOL/L (ref 8–16)
APTT BLD: 30.5 SECONDS (ref 26.9–34.4)
AST SERPL-CCNC: 18 U/L (ref 15–37)
BASOPHILS # BLD: 0.6 % (ref 0–2)
BILIRUB SERPL-MCNC: 0.1 MG/DL (ref 0.2–1)
BUN SERPL-MCNC: 11 MG/DL (ref 7–18)
CALCIUM SERPL-MCNC: 8.6 MG/DL (ref 8.5–10.1)
CHLORIDE SERPL-SCNC: 106 MMOL/L (ref 98–107)
CO2 SERPL-SCNC: 29 MMOL/L (ref 21–32)
CREAT SERPL-MCNC: 0.7 MG/DL (ref 0.55–1.02)
DEPRECATED RDW RBC AUTO: 14.6 % (ref 11.6–15.6)
EOSINOPHIL # BLD: 4.9 % (ref 0–4.5)
GLUCOSE SERPL-MCNC: 103 MG/DL (ref 74–106)
HCT VFR BLD CALC: 35.5 % (ref 32.4–45.2)
HGB BLD-MCNC: 11.9 GM/DL (ref 10.7–15.3)
INR BLD: 1.19 (ref 0.82–1.09)
LYMPHOCYTES # BLD: 31.9 % (ref 8–40)
MAGNESIUM SERPL-MCNC: 2.1 MG/DL (ref 1.8–2.4)
MCH RBC QN AUTO: 27.4 PG (ref 25.7–33.7)
MCHC RBC AUTO-ENTMCNC: 33.6 G/DL (ref 32–36)
MCV RBC: 81.6 FL (ref 80–96)
MONOCYTES # BLD AUTO: 7.2 % (ref 3.8–10.2)
NEUTROPHILS # BLD: 55.4 % (ref 42.8–82.8)
PHOSPHATE SERPL-MCNC: 3.4 MG/DL (ref 2.5–4.9)
PLATELET # BLD AUTO: 227 K/MM3 (ref 134–434)
PMV BLD: 8.4 FL (ref 7.5–11.1)
POTASSIUM SERPLBLD-SCNC: 4.8 MMOL/L (ref 3.5–5.1)
PROT SERPL-MCNC: 6.6 G/DL (ref 6.4–8.2)
PT PNL PPP: 13.4 SEC (ref 9.98–11.88)
RBC # BLD AUTO: 4.35 M/MM3 (ref 3.6–5.2)
SODIUM SERPL-SCNC: 140 MMOL/L (ref 136–145)
WBC # BLD AUTO: 4.9 K/MM3 (ref 4–10)

## 2018-04-10 RX ADMIN — SODIUM CHLORIDE SCH MLS/HR: 9 INJECTION, SOLUTION INTRAVENOUS at 00:52

## 2018-04-10 RX ADMIN — HEPARIN SODIUM SCH: 5000 INJECTION, SOLUTION INTRAVENOUS; SUBCUTANEOUS at 21:29

## 2018-04-10 RX ADMIN — ATORVASTATIN CALCIUM SCH: 20 TABLET, FILM COATED ORAL at 21:29

## 2018-04-10 RX ADMIN — HEPARIN SODIUM SCH: 5000 INJECTION, SOLUTION INTRAVENOUS; SUBCUTANEOUS at 13:40

## 2018-04-10 RX ADMIN — ARIPIPRAZOLE SCH MG: 5 TABLET ORAL at 21:31

## 2018-04-10 RX ADMIN — HEPARIN SODIUM SCH: 5000 INJECTION, SOLUTION INTRAVENOUS; SUBCUTANEOUS at 06:12

## 2018-04-10 RX ADMIN — ERTAPENEM SODIUM SCH MLS/HR: 1 INJECTION, POWDER, LYOPHILIZED, FOR SOLUTION INTRAMUSCULAR; INTRAVENOUS at 10:17

## 2018-04-10 RX ADMIN — ESCITALOPRAM SCH MG: 20 TABLET, FILM COATED ORAL at 09:26

## 2018-04-10 RX ADMIN — AMLODIPINE BESYLATE SCH MG: 5 TABLET ORAL at 09:25

## 2018-04-10 RX ADMIN — SODIUM CHLORIDE SCH MLS/HR: 9 INJECTION, SOLUTION INTRAVENOUS at 15:04

## 2018-04-10 NOTE — PN
Physical Exam: 


SUBJECTIVE: Patient seen and examined at bedside. Patient states that pain is 

better today. No new complaints.





OBJECTIVE:





 Vital Signs











 Period  Temp  Pulse  Resp  BP Sys/Negrete  Pulse Ox


 


 Last 24 Hr  98.2 F-98.5 F  64-72    117-134/69-76  97











GENERAL: The patient is awake, alert, and fully oriented, in no acute distress.


NECK: Trachea midline, full range of motion, supple. 


LUNGS: Breath sounds equal, clear to auscultation bilaterally, no wheezes, no 

crackles, no accessory muscle use. 


HEART: Regular rate and rhythm, S1, S2 without murmur, rub or gallop.


ABDOMEN: Soft, nontender, nondistended, normoactive bowel sounds, no guarding, 

no rebound, no hepatosplenomegaly, no masses. Mild tenderness to palpation in 

the right CVA 


EXTREMITIES: 2+ pulses, warm, well-perfused, no edema. 


NEUROLOGICAL: Cranial nerves II through X grossly intact. Normal speech, gait 

not observed.


PSYCH: Normal mood, normal affect.


SKIN: Warm, dry, normal turgor, no rashes or lesions noted














 Laboratory Results - last 24 hr











  04/10/18 04/10/18 04/10/18





  06:00 06:00 06:00


 


WBC  4.9  


 


RBC  4.35  


 


Hgb  11.9  D  


 


Hct  35.5  D  


 


MCV  81.6  


 


MCH  27.4  


 


MCHC  33.6  


 


RDW  14.6  


 


Plt Count  227  


 


MPV  8.4  


 


Neutrophils %  55.4  


 


Lymphocytes %  31.9  


 


Monocytes %  7.2  


 


Eosinophils %  4.9 H  


 


Basophils %  0.6  


 


PT with INR   13.40 H 


 


INR   1.19 H 


 


PTT (Actin FS)   30.5 


 


Sodium    140


 


Potassium    4.8


 


Chloride    106


 


Carbon Dioxide    29


 


Anion Gap    5 L


 


BUN    11


 


Creatinine    0.7


 


Creat Clearance w eGFR    > 60


 


Random Glucose    103


 


Calcium    8.6


 


Phosphorus    3.4


 


Magnesium    2.1


 


Total Bilirubin    0.1 L D


 


AST    18


 


ALT    21


 


Alkaline Phosphatase    81


 


Total Protein    6.6


 


Albumin    3.3 L








Active Medications











Generic Name Dose Route Start Last Admin





  Trade Name Freq  PRN Reason Stop Dose Admin


 


Acetaminophen  650 mg  04/09/18 14:12  





  Tylenol -  PO   





  Q6H PRN   





  PAIN LEVEL 1-5   


 


Alprazolam  0.5 mg  04/09/18 22:00  04/10/18 06:13





  Xanax -  PO   0.5 mg





  TID THERESE   Administration


 


Amlodipine Besylate  5 mg  04/10/18 10:00  04/10/18 09:25





  Norvasc -  PO   5 mg





  DAILY THERESE   Administration


 


Aripiprazole  5 mg  04/09/18 22:00  04/09/18 21:53





  Abilify  PO   5 mg





  HS THERESE   Administration


 


Atorvastatin Calcium  20 mg  04/09/18 22:00  04/09/18 21:57





  Lipitor -  PO   Not Given





  HS THERESE   


 


Escitalopram Oxalate  20 mg  04/10/18 10:00  04/10/18 09:26





  Lexapro -  PO   20 mg





  DAILY THERESE   Administration


 


Heparin Sodium (Porcine)  5,000 unit  04/09/18 22:00  04/10/18 06:12





  Heparin -  SQ   Not Given





  TID THERESE   


 


Ertapenem 1 gm/ Sodium  100 mls @ 200 mls/hr  04/10/18 10:00  04/10/18 10:17





  Chloride  IVPB   200 mls/hr





  DAILY THERESE   Administration


 


Sodium Chloride  1,000 mls @ 75 mls/hr  04/10/18 00:30  04/10/18 00:52





  Normal Saline -  IV   75 mls/hr





  ASDIR THERESE   Administration


 


Ketorolac Tromethamine  10 mg  04/09/18 16:59  04/10/18 10:16





  Toradol  PO  04/14/18 14:14  10 mg





  Q6H PRN   Administration





  PAIN LEVEL 6-10   


 


Ondansetron HCl  4 mg  04/09/18 14:10  





  Zofran Injection  IVPUSH   





  Q6H PRN   





  NAUSEA   


 


Zolpidem Tartrate  5 mg  04/09/18 22:00  





  Ambien -  PO   





  HS PRN   





  INSOMNIA   











ASSESSMENT/PLAN:


The patient is a 63 yo f w/ PMH hyperparathyroidism s/p resection, recurrent 

kidney stones and pyelonephritis who is being admitted for the clinical signs 

and symptoms of pyelonephritis. 





#Right flank pain and dysuria 2/2 pyelonephritis. 


   -Positive UA


   -Ucx pending


   -ID consulted


   -c/w ertapenem


   -Tylenol and oxycodone for pain control


   -CT AP shows 2mm non-obstructing stones b/l 


   -zofran PRN


   -NS @ 75


   -s/p 1L NS in ED


   -isolation precautions due to hx ESBL





#HTN


   -C/w home Norvasc 5mg





#asthma


   -PRN nebs


 


#HLD


   -c/w lipitor 20mg HS





#Anxiety


   -c/w home Abilify 5mg HS


   -c/w home xanax .5mg TID


   -c/w home ambien 10mg HS





#Depression 


   -c/w lexapro 20mg daily 





#FEN


   -NS @ 100 for 1 bag, then @75


   -monitor lytes


   -sodium controlled diet





#Prophy


   -Hep SQ 5kU TID





#Dispo


   -admit to med surg





Visit type





- Emergency Visit


Emergency Visit: Yes


ED Registration Date: 04/09/18


Care time: The patient presented to the Emergency Department on the above date 

and was hospitalized for further evaluation of their emergent condition.





- New Patient


This patient is new to me today: No





- Critical Care


Critical Care patient: No

## 2018-04-10 NOTE — PN
Teaching Attending Note


Name of Resident: Shon Funes





ATTENDING PHYSICIAN STATEMENT





I saw and evaluated the patient.


I reviewed the resident's note and discussed the case with the resident.


I agree with the resident's findings and plan as documented.








SUBJECTIVE:


C/O R flank pain, urinary frequency


No fever/ chills


Urine c/s GNR








OBJECTIVE:


Afebrile


+ R CVAT








ASSESSMENT AND PLAN:


Renal colic


Nephrolithiasis


Recurrent  UTI ? ESBL


Continue ertapenem


Await c/s result

## 2018-04-10 NOTE — PN
Physical Exam: 


SUBJECTIVE: Patient seen and examined at bedside. Pt stable, afebrile, 

comfortable. Pt is feeling much better, but pain has not resolved altogether. 

Pt complains of chronic constipation no worse now than usual.


Pt had a CT abdomen showing b/l nonobstructive renal stones. 








OBJECTIVE:





 Vital Signs











 Period  Temp  Pulse  Resp  BP Sys/Negrete  Pulse Ox


 


 Last 24 Hr  98.2 F-98.5 F  64-72    117-134/69-76  97











GENERAL: The patient is awake, alert, and fully oriented, in no acute distress.


HEAD: Normal with no signs of trauma.


EYES: PERRL, extraocular movements intact, sclera anicteric, conjunctiva clear. 

No ptosis. 


ENT: Ears normal, nares patent, oropharynx clear without exudates, moist mucous 


membranes.


NECK: Trachea midline, full range of motion, supple. 


LUNGS: Breath sounds equal, clear to auscultation bilaterally, no wheezes, no 

crackles, no 


accessory muscle use. 


HEART: Regular rate and rhythm, S1, S2 without murmur, rub or gallop.


ABDOMEN: moderate R CVA tenderness on fist percussion. Improved since 

yesterday. Soft, nondistended, normoactive bowel sounds, no guarding, no rebound

, no hepatosplenomegaly, no masses.


EXTREMITIES: 2+ pulses, warm, well-perfused, no edema. 


NEUROLOGICAL: Cranial nerves II through XII grossly intact. Normal speech, gait 

not 


observed.


PSYCH: Normal mood, normal affect.


SKIN: Warm, dry, normal turgor, no rashes or lesions noted














 Laboratory Results - last 24 hr











  04/10/18 04/10/18 04/10/18





  06:00 06:00 06:00


 


WBC  4.9  


 


RBC  4.35  


 


Hgb  11.9  D  


 


Hct  35.5  D  


 


MCV  81.6  


 


MCH  27.4  


 


MCHC  33.6  


 


RDW  14.6  


 


Plt Count  227  


 


MPV  8.4  


 


Neutrophils %  55.4  


 


Lymphocytes %  31.9  


 


Monocytes %  7.2  


 


Eosinophils %  4.9 H  


 


Basophils %  0.6  


 


PT with INR   13.40 H 


 


INR   1.19 H 


 


PTT (Actin FS)   30.5 


 


Sodium    140


 


Potassium    4.8


 


Chloride    106


 


Carbon Dioxide    29


 


Anion Gap    5 L


 


BUN    11


 


Creatinine    0.7


 


Creat Clearance w eGFR    > 60


 


Random Glucose    103


 


Calcium    8.6


 


Phosphorus    3.4


 


Magnesium    2.1


 


Total Bilirubin    0.1 L D


 


AST    18


 


ALT    21


 


Alkaline Phosphatase    81


 


Total Protein    6.6


 


Albumin    3.3 L








Active Medications











Generic Name Dose Route Start Last Admin





  Trade Name Jakeq  PRN Reason Stop Dose Admin


 


Acetaminophen  650 mg  04/09/18 14:12  





  Tylenol -  PO   





  Q6H PRN   





  PAIN LEVEL 1-5   


 


Alprazolam  0.5 mg  04/09/18 22:00  04/10/18 06:13





  Xanax -  PO   0.5 mg





  TID THERESE   Administration


 


Amlodipine Besylate  5 mg  04/10/18 10:00  04/10/18 09:25





  Norvasc -  PO   5 mg





  DAILY THERESE   Administration


 


Aripiprazole  5 mg  04/09/18 22:00  04/09/18 21:53





  Abilify  PO   5 mg





  HS THERESE   Administration


 


Atorvastatin Calcium  20 mg  04/09/18 22:00  04/09/18 21:57





  Lipitor -  PO   Not Given





  HS THERESE   


 


Escitalopram Oxalate  20 mg  04/10/18 10:00  04/10/18 09:26





  Lexapro -  PO   20 mg





  DAILY THERESE   Administration


 


Heparin Sodium (Porcine)  5,000 unit  04/09/18 22:00  04/10/18 06:12





  Heparin -  SQ   Not Given





  TID THERESE   


 


Ertapenem 1 gm/ Sodium  100 mls @ 200 mls/hr  04/10/18 10:00  04/10/18 10:17





  Chloride  IVPB   200 mls/hr





  DAILY THERESE   Administration


 


Sodium Chloride  1,000 mls @ 75 mls/hr  04/10/18 00:30  04/10/18 00:52





  Normal Saline -  IV   75 mls/hr





  ASDIR THERESE   Administration


 


Ketorolac Tromethamine  10 mg  04/09/18 16:59  04/10/18 10:16





  Toradol  PO  04/14/18 14:14  10 mg





  Q6H PRN   Administration





  PAIN LEVEL 6-10   


 


Ondansetron HCl  4 mg  04/09/18 14:10  





  Zofran Injection  IVPUSH   





  Q6H PRN   





  NAUSEA   


 


Zolpidem Tartrate  5 mg  04/09/18 22:00  





  Ambien -  PO   





  HS PRN   





  INSOMNIA   











ASSESSMENT/PLAN:





#UTI vs Pyelo


-improving on Ertapenem


-Prelim Cx pos for gram neg lactose fermenting bacilli


-pain control with Toradol


-contact isolation


-Recommend post-void bladder scan as pt has had difficulty voiding

















Visit type





- Emergency Visit


Emergency Visit: No





- New Patient


This patient is new to me today: Yes


Date on this admission: 04/10/18





- Critical Care


Critical Care patient: No

## 2018-04-10 NOTE — PN
Teaching Attending Note


Name of Resident: Bebo De





ATTENDING PHYSICIAN STATEMENT





I saw and evaluated the patient.


I reviewed the resident's note and discussed the case with the resident.


I agree with the resident's findings and plan as documented.








SUBJECTIVE: Patient feels better. 








OBJECTIVE:


 Vital Signs











 Period  Temp  Pulse  Resp  BP Sys/Negrete  Pulse Ox


 


 Last 24 Hr  98.2 F-98.6 F  64-67  18-19  107-122/60-75  97








HEART: S1S2, RRR


LUNGS: Clear


ABDOMEN: Soft, non-tender, non-distended, normal BS, no CVA tenderness


EXTREMITIES: No edema





 Laboratory Results - last 24 hr











  04/10/18 04/10/18 04/10/18





  06:00 06:00 06:00


 


WBC  4.9  


 


RBC  4.35  


 


Hgb  11.9  D  


 


Hct  35.5  D  


 


MCV  81.6  


 


MCH  27.4  


 


MCHC  33.6  


 


RDW  14.6  


 


Plt Count  227  


 


MPV  8.4  


 


Neutrophils %  55.4  


 


Lymphocytes %  31.9  


 


Monocytes %  7.2  


 


Eosinophils %  4.9 H  


 


Basophils %  0.6  


 


PT with INR   13.40 H 


 


INR   1.19 H 


 


PTT (Actin FS)   30.5 


 


Sodium    140


 


Potassium    4.8


 


Chloride    106


 


Carbon Dioxide    29


 


Anion Gap    5 L


 


BUN    11


 


Creatinine    0.7


 


Creat Clearance w eGFR    > 60


 


Random Glucose    103


 


Calcium    8.6


 


Phosphorus    3.4


 


Magnesium    2.1


 


Total Bilirubin    0.1 L D


 


AST    18


 


ALT    21


 


Alkaline Phosphatase    81


 


Total Protein    6.6


 


Albumin    3.3 L








 Current Medications











Generic Name Dose Route Start Last Admin





  Trade Name Freq  PRN Reason Stop Dose Admin


 


Acetaminophen  650 mg  04/09/18 14:12  





  Tylenol -  PO   





  Q6H PRN   





  PAIN LEVEL 1-5   


 


Alprazolam  0.5 mg  04/09/18 22:00  04/10/18 13:39





  Xanax -  PO   0.5 mg





  TID THERESE   Administration


 


Amlodipine Besylate  5 mg  04/10/18 10:00  04/10/18 09:25





  Norvasc -  PO   5 mg





  DAILY THERESE   Administration


 


Aripiprazole  5 mg  04/09/18 22:00  04/09/18 21:53





  Abilify  PO   5 mg





  HS THERESE   Administration


 


Atorvastatin Calcium  20 mg  04/09/18 22:00  04/09/18 21:57





  Lipitor -  PO   Not Given





  HS THERESE   


 


Docusate Sodium  100 mg  04/11/18 10:00  





  Colace -  PO   





  DAILY THERESE   


 


Escitalopram Oxalate  20 mg  04/10/18 10:00  04/10/18 09:26





  Lexapro -  PO   20 mg





  DAILY THERESE   Administration


 


Heparin Sodium (Porcine)  5,000 unit  04/09/18 22:00  04/10/18 13:40





  Heparin -  SQ   Not Given





  TID THERESE   


 


Ertapenem 1 gm/ Sodium  100 mls @ 200 mls/hr  04/10/18 10:00  04/10/18 10:17





  Chloride  IVPB   200 mls/hr





  DAILY THERESE   Administration


 


Sodium Chloride  1,000 mls @ 75 mls/hr  04/10/18 00:30  04/10/18 15:04





  Normal Saline -  IV   75 mls/hr





  ASDIR THERESE   Administration


 


Ondansetron HCl  4 mg  04/09/18 14:10  





  Zofran Injection  IVPUSH   





  Q6H PRN   





  NAUSEA   


 


Oxycodone HCl  10 mg  04/10/18 13:55  04/10/18 15:01





  Roxicodone -  PO   10 mg





  Q6H PRN   Administration





  PAIN LEVEL 6-10   


 


Zolpidem Tartrate  5 mg  04/09/18 22:00  





  Ambien -  PO   





  HS PRN   





  INSOMNIA   














ASSESSMENT AND PLAN:


This is a 62 year old woman with a history of HTN, hyperlipidemia, kidney stones

, pyelonephritis, ESBL E. coli UTI, depression, anxiety who presented to the ED 

with right flank pain, dysuria, urinary frequency, nausea,and chills.





1. UTI, possible acute pyelonephritis. 


   - Continue Invanz (day 2)


   - Urine culture pending


   - CT shows bilateral nonobstructing renal stones, diverticulosis, small 

bilateral adrenal adenomas, moderate fecal retention


2. HTN


   - Continue Norvasc


3. Hyperlipidemia


   - Continue Lipitor


4. Depression/anxiety


   - Continue Lexapro, Abilify, Xanax

## 2018-04-11 VITALS — SYSTOLIC BLOOD PRESSURE: 123 MMHG | TEMPERATURE: 98.1 F | DIASTOLIC BLOOD PRESSURE: 71 MMHG | HEART RATE: 66 BPM

## 2018-04-11 LAB
ANION GAP SERPL CALC-SCNC: 5 MMOL/L (ref 8–16)
BUN SERPL-MCNC: 8 MG/DL (ref 7–18)
CALCIUM SERPL-MCNC: 8.4 MG/DL (ref 8.5–10.1)
CHLORIDE SERPL-SCNC: 110 MMOL/L (ref 98–107)
CO2 SERPL-SCNC: 28 MMOL/L (ref 21–32)
CREAT SERPL-MCNC: 0.6 MG/DL (ref 0.55–1.02)
DEPRECATED RDW RBC AUTO: 14.5 % (ref 11.6–15.6)
GLUCOSE SERPL-MCNC: 92 MG/DL (ref 74–106)
HCT VFR BLD CALC: 35 % (ref 32.4–45.2)
HGB BLD-MCNC: 11.5 GM/DL (ref 10.7–15.3)
MCH RBC QN AUTO: 27 PG (ref 25.7–33.7)
MCHC RBC AUTO-ENTMCNC: 33 G/DL (ref 32–36)
MCV RBC: 81.9 FL (ref 80–96)
PLATELET # BLD AUTO: 222 K/MM3 (ref 134–434)
PMV BLD: 8.7 FL (ref 7.5–11.1)
POTASSIUM SERPLBLD-SCNC: 4.6 MMOL/L (ref 3.5–5.1)
RBC # BLD AUTO: 4.28 M/MM3 (ref 3.6–5.2)
SODIUM SERPL-SCNC: 143 MMOL/L (ref 136–145)
WBC # BLD AUTO: 4 K/MM3 (ref 4–10)

## 2018-04-11 RX ADMIN — SODIUM CHLORIDE SCH MLS/HR: 9 INJECTION, SOLUTION INTRAVENOUS at 17:00

## 2018-04-11 RX ADMIN — HEPARIN SODIUM SCH: 5000 INJECTION, SOLUTION INTRAVENOUS; SUBCUTANEOUS at 14:04

## 2018-04-11 RX ADMIN — AMLODIPINE BESYLATE SCH MG: 5 TABLET ORAL at 09:52

## 2018-04-11 RX ADMIN — SODIUM CHLORIDE SCH: 9 INJECTION, SOLUTION INTRAVENOUS at 06:15

## 2018-04-11 RX ADMIN — HEPARIN SODIUM SCH: 5000 INJECTION, SOLUTION INTRAVENOUS; SUBCUTANEOUS at 06:09

## 2018-04-11 RX ADMIN — SODIUM CHLORIDE SCH MLS/HR: 9 INJECTION, SOLUTION INTRAVENOUS at 04:10

## 2018-04-11 RX ADMIN — ESCITALOPRAM SCH MG: 20 TABLET, FILM COATED ORAL at 09:53

## 2018-04-11 RX ADMIN — ERTAPENEM SODIUM SCH MLS/HR: 1 INJECTION, POWDER, LYOPHILIZED, FOR SOLUTION INTRAMUSCULAR; INTRAVENOUS at 09:52

## 2018-04-11 NOTE — PN
Teaching Attending Note


Name of Resident: Bebo De





ATTENDING PHYSICIAN STATEMENT





I saw and evaluated the patient.


I reviewed the resident's note and discussed the case with the resident.


I agree with the resident's findings and plan as documented.








SUBJECTIVE:


Pateint is feeling better with no fever or chills.





OBJECTIVE:


 Vital Signs











Temperature  98.1 F   04/11/18 09:50


 


Pulse Rate  66   04/11/18 09:50


 


Respiratory Rate  17   04/11/18 09:50


 


Blood Pressure  123/71   04/11/18 09:50


 


O2 Sat by Pulse Oximetry (%)  98   04/11/18 09:50








 CBCD











WBC  4.0 K/mm3 (4.0-10.0)   04/11/18  08:00    


 


RBC  4.28 M/mm3 (3.60-5.2)   04/11/18  08:00    


 


Hgb  11.5 GM/dL (10.7-15.3)   04/11/18  08:00    


 


Hct  35.0 % (32.4-45.2)   04/11/18  08:00    


 


MCV  81.9 fl (80-96)   04/11/18  08:00    


 


MCHC  33.0 g/dl (32.0-36.0)   04/11/18  08:00    


 


RDW  14.5 % (11.6-15.6)   04/11/18  08:00    


 


Plt Count  222 K/MM3 (134-434)   04/11/18  08:00    


 


MPV  8.7 fl (7.5-11.1)   04/11/18  08:00    








 CMP











Sodium  143 mmol/L (136-145)   04/11/18  08:00    


 


Potassium  4.6 mmol/L (3.5-5.1)   04/11/18  08:00    


 


Chloride  110 mmol/L ()  H  04/11/18  08:00    


 


Carbon Dioxide  28 mmol/L (21-32)   04/11/18  08:00    


 


Anion Gap  5  (8-16)  L  04/11/18  08:00    


 


BUN  8 mg/dL (7-18)   04/11/18  08:00    


 


Creatinine  0.6 mg/dL (0.55-1.02)   04/11/18  08:00    


 


Creat Clearance w eGFR  > 60  (>60)   04/10/18  06:00    


 


Random Glucose  92 mg/dL ()   04/11/18  08:00    


 


Calcium  8.4 mg/dL (8.5-10.1)  L  04/11/18  08:00    


 


Total Bilirubin  0.1 mg/dL (0.2-1.0)  L D 04/10/18  06:00    


 


AST  18 U/L (15-37)   04/10/18  06:00    


 


ALT  21 U/L (12-78)   04/10/18  06:00    


 


Alkaline Phosphatase  81 U/L ()   04/10/18  06:00    


 


Total Protein  6.6 g/dl (6.4-8.2)   04/10/18  06:00    


 


Albumin  3.3 g/dl (3.4-5.0)  L  04/10/18  06:00    








 Current Medications











Generic Name Dose Route Start Last Admin





  Trade Name Freq  PRN Reason Stop Dose Admin


 


Acetaminophen  650 mg  04/09/18 14:12  





  Tylenol -  PO   





  Q6H PRN   





  PAIN LEVEL 1-5   


 


Alprazolam  0.5 mg  04/09/18 22:00  04/11/18 14:04





  Xanax -  PO   0.5 mg





  TID THERESE   Administration


 


Amlodipine Besylate  5 mg  04/10/18 10:00  04/11/18 09:52





  Norvasc -  PO   5 mg





  DAILY THERESE   Administration


 


Aripiprazole  5 mg  04/09/18 22:00  04/10/18 21:31





  Abilify  PO   5 mg





  HS THERESE   Administration


 


Atorvastatin Calcium  20 mg  04/09/18 22:00  04/10/18 21:29





  Lipitor -  PO   Not Given





  HS THERESE   


 


Docusate Sodium  100 mg  04/11/18 10:00  04/11/18 09:53





  Colace -  PO   100 mg





  DAILY THERESE   Administration


 


Escitalopram Oxalate  20 mg  04/10/18 10:00  04/11/18 09:53





  Lexapro -  PO   20 mg





  DAILY THERESE   Administration


 


Heparin Sodium (Porcine)  5,000 unit  04/09/18 22:00  04/11/18 14:04





  Heparin -  SQ   Not Given





  TID THERESE   


 


Ertapenem 1 gm/ Sodium  100 mls @ 200 mls/hr  04/10/18 10:00  04/11/18 09:52





  Chloride  IVPB   200 mls/hr





  DAILY THERESE   Administration


 


Sodium Chloride  1,000 mls @ 75 mls/hr  04/10/18 00:30  04/11/18 06:15





  Normal Saline -  IV   Not Given





  ASDIR THERESE   


 


Ondansetron HCl  4 mg  04/09/18 14:10  





  Zofran Injection  IVPUSH   





  Q6H PRN   





  NAUSEA   


 


Oxycodone HCl  10 mg  04/10/18 13:55  04/11/18 06:13





  Roxicodone -  PO   10 mg





  Q6H PRN   Administration





  PAIN LEVEL 6-10   


 


Zolpidem Tartrate  5 mg  04/09/18 22:00  





  Ambien -  PO   





  HS PRN   





  INSOMNIA   








 Home Medications











 Medication  Instructions  Recorded


 


Alprazolam [Xanax] 0.5 mg PO TID 04/09/18


 


Amlodipine Besylate [Norvasc -] 5 mg PO DAILY 04/09/18


 


Aripiprazole [Abilify] 5 mg PO HS 04/09/18


 


Atorvastatin Calcium [Lipitor] 20 mg PO HS 04/09/18


 


Escitalopram Oxalate [Lexapro -] 20 mg PO DAILY 04/09/18


 


Zolpidem Tartrate 10 mg PO HS 04/09/18


 


Acetaminophen [Tylenol .Regular 650 mg PO Q6H PRN  tablet 04/11/18





Strength -]  


 


Cefixime [Suprax -] 400 mg PO DAILY #10 capsule 04/11/18


 


Docusate Sodium [Colace -] 100 mg PO DAILY  capsule 04/11/18


 


Oxycodone HCl 5 mg PO Q12H #4 capsule MDD 2 04/11/18





 Microbiology





04/09/18 10:15   Urine - Urine Clean Catch   Urine Culture - Final


                            Escherichia Coli








PE;per resident's note


No Cva tenderness.





ASSESSMENT AND PLAN:


This is a 62 year old woman with a history of HTN, hyperlipidemia, kidney stones

, pyelonephritis, ESBL E. coli UTI, depression, anxiety who presented to the ED 

with right flank pain, dysuria, urinary frequency, nausea,and chills.





# Acute Pyelonephritis; UTI on Ertapenem x 3 days as per ID patient can be 

switched to oral Antibiotics , given 2 days of pain meds as  needed. Follow 

with Urologist in a week period.   


  CT shows bilateral nonobstructing renal stones, diverticulosis, small 

bilateral adrenal adenomas, moderate fecal retention


# HTN  Continue Norvasc


# Hyperlipidemia continue Lipitor


# Depression/anxiety continue Lexapro, Abilify, Xanax





will discharge patient home.

## 2018-04-11 NOTE — PN
Physical Exam: 


SUBJECTIVE: Patient seen and examined at bedside. No acute events. Pt still 

having some difficulty passing urine but states this is roughly her baseline. 

Pain is controlled. No other complaints. Stable, afebrile.








OBJECTIVE:





 Vital Signs











 Period  Temp  Pulse  Resp  BP Sys/Negrete  Pulse Ox


 


 Last 24 Hr  97.8 F-98.6 F  62-67  18-20  107-119/60-75  96-98











GENERAL: The patient is awake, alert, and fully oriented, in no acute distress.


HEAD: Normal with no signs of trauma.


EYES: PERRL, extraocular movements intact, sclera anicteric, conjunctiva clear. 

No ptosis. 


ENT: Ears normal, nares patent, oropharynx clear without exudates, moist mucous 


membranes.


NECK: Trachea midline, full range of motion, supple. 


LUNGS: Breath sounds equal, clear to auscultation bilaterally, no wheezes, no 

crackles, no 


accessory muscle use. 


HEART: Regular rate and rhythm, S1, S2 without murmur, rub or gallop.


ABDOMEN: Soft, nontender, nondistended, normoactive bowel sounds, no guarding, 

no 


rebound, no hepatosplenomegaly, no masses.


EXTREMITIES: 2+ pulses, warm, well-perfused, no edema. 


NEUROLOGICAL: Cranial nerves II through XII grossly intact. Normal speech, gait 

not 


observed.


PSYCH: Normal mood, normal affect.


SKIN: Warm, dry, normal turgor, no rashes or lesions noted














 Laboratory Results - last 24 hr











  04/11/18





  08:00


 


WBC  4.0


 


RBC  4.28


 


Hgb  11.5


 


Hct  35.0


 


MCV  81.9


 


MCH  27.0


 


MCHC  33.0


 


RDW  14.5


 


Plt Count  222


 


MPV  8.7








Active Medications











Generic Name Dose Route Start Last Admin





  Trade Name Freq  PRN Reason Stop Dose Admin


 


Acetaminophen  650 mg  04/09/18 14:12  





  Tylenol -  PO   





  Q6H PRN   





  PAIN LEVEL 1-5   


 


Alprazolam  0.5 mg  04/09/18 22:00  04/11/18 06:10





  Xanax -  PO   0.5 mg





  TID THERESE   Administration


 


Amlodipine Besylate  5 mg  04/10/18 10:00  04/10/18 09:25





  Norvasc -  PO   5 mg





  DAILY THERESE   Administration


 


Aripiprazole  5 mg  04/09/18 22:00  04/10/18 21:31





  Abilify  PO   5 mg





  HS THERESE   Administration


 


Atorvastatin Calcium  20 mg  04/09/18 22:00  04/10/18 21:29





  Lipitor -  PO   Not Given





  HS THERESE   


 


Docusate Sodium  100 mg  04/11/18 10:00  





  Colace -  PO   





  DAILY THERESE   


 


Escitalopram Oxalate  20 mg  04/10/18 10:00  04/10/18 09:26





  Lexapro -  PO   20 mg





  DAILY THERESE   Administration


 


Heparin Sodium (Porcine)  5,000 unit  04/09/18 22:00  04/11/18 06:09





  Heparin -  SQ   Not Given





  TID THERESE   


 


Ertapenem 1 gm/ Sodium  100 mls @ 200 mls/hr  04/10/18 10:00  04/10/18 10:17





  Chloride  IVPB   200 mls/hr





  DAILY THERESE   Administration


 


Sodium Chloride  1,000 mls @ 75 mls/hr  04/10/18 00:30  04/11/18 06:15





  Normal Saline -  IV   Not Given





  ASDIR THERESE   


 


Ondansetron HCl  4 mg  04/09/18 14:10  





  Zofran Injection  IVPUSH   





  Q6H PRN   





  NAUSEA   


 


Oxycodone HCl  10 mg  04/10/18 13:55  04/11/18 06:13





  Roxicodone -  PO   10 mg





  Q6H PRN   Administration





  PAIN LEVEL 6-10   


 


Zolpidem Tartrate  5 mg  04/09/18 22:00  





  Ambien -  PO   





  HS PRN   





  INSOMNIA   











ASSESSMENT/PLAN:





#UTI vs Pyelo


-continues to improve on Ertapenem


-Cx pos for E. coli


-CT neg for obstructive stones





-Recommend Cephalosporin or Fluoroquinolone. Cefixime (preferred due to 

borderline QTc) 400mg daily x 10 d





Shon Funes MD PGY-1 ID








Visit type





- Emergency Visit


Emergency Visit: No





- New Patient


This patient is new to me today: No





- Critical Care


Critical Care patient: No

## 2018-04-11 NOTE — PN
Teaching Attending Note


Name of Resident: Shon Funes





ATTENDING PHYSICIAN STATEMENT





I saw and evaluated the patient.


I reviewed the resident's note and discussed the case with the resident.


I agree with the resident's findings and plan as documented.








SUBJECTIVE:


feels improved


eating, no fevers


pain is less





OBJECTIVE:


 Vital Signs











 Period  Temp  Pulse  Resp  BP Sys/Negrete  Pulse Ox


 


 Last 24 Hr  97.8 F-98.4 F  62-67  17-20  108-123/67-71  96-98








cor-rrr


lungs clear


abd soft, minimal right cvat


ext no edema





 CBC, BMP





 04/11/18 08:00 





 04/11/18 08:00 





 Microbiology





04/09/18 10:15   Urine - Urine Clean Catch   Urine Culture - Final


                            Escherichia Coli











ASSESSMENT AND PLAN:


ecoli uti


pyelonephritis


day #3 ertapenem


can switch to po cephalosporin as outlined in Dr Funes's note





please call back if needed

## 2018-04-11 NOTE — DS
Physical Exam: 


SUBJECTIVE: Patient seen and examined at bedside. Patient feels better today.








OBJECTIVE:





 Vital Signs











 Period  Temp  Pulse  Resp  BP Sys/Negrete  Pulse Ox


 


 Last 24 Hr  98.1 F-98.4 F  66-67  17-20  108-123/67-71  98








PHYSICAL EXAM





GENERAL: The patient is awake, alert, and fully oriented, in no acute distress.


NECK: Trachea midline, full range of motion, supple. 


LUNGS: Breath sounds equal, clear to auscultation bilaterally, no wheezes, no 

crackles, no accessory muscle use. 


HEART: Regular rate and rhythm, S1, S2 without murmur, rub or gallop.


ABDOMEN: Soft, nontender, nondistended, normoactive bowel sounds, no guarding, 

no rebound, no hepatosplenomegaly, no masses. Mild tenderness to palpation in 

the right CVA 


EXTREMITIES: 2+ pulses, warm, well-perfused, no edema. 


NEUROLOGICAL: Cranial nerves II through X grossly intact. Normal speech, gait 

not observed.


PSYCH: Normal mood, normal affect.


SKIN: Warm, dry, normal turgor, no rashes or lesions noted





LABS


 Laboratory Results - last 24 hr











  04/11/18 04/11/18





  08:00 08:00


 


WBC  4.0 


 


RBC  4.28 


 


Hgb  11.5 


 


Hct  35.0 


 


MCV  81.9 


 


MCH  27.0 


 


MCHC  33.0 


 


RDW  14.5 


 


Plt Count  222 


 


MPV  8.7 


 


Sodium   143


 


Potassium   4.6


 


Chloride   110 H


 


Carbon Dioxide   28


 


Anion Gap   5 L


 


BUN   8


 


Creatinine   0.6


 


Random Glucose   92


 


Calcium   8.4 L











HOSPITAL COURSE:





Date of Admission:04/09/18


The patient is a 61 yo f w/ PMH hyperparathryoid s/p resection, recurrent 

kidney stones and pyelonephritis (grew ESBL in the past) who comes to the ED c/

o right sided flank pain. The patient described a 3 week hx of burning on 

urination as well as frequency and incomplete voiding associated with a sharp, 

10/10 intermittent right sided flank pain which did not radiate. In the ED, the 

patient was found to have a UA indicative of a UTI. Given the patient's hx ESBL

, she was admitted for IV ABX. ID was consulted. Urology was consulted. A CT 

showed b/l non-obstructing 2mm kidney stones. The patient was treated with 

tylenol, toradol, oxycodone, ertapenem. The patient improved with the above 

treatment. A urine culture taken prior to abx grew pansensitive E. coli. The 

patient was discharged on cefixime 400mg daily for 10 days as well as 5mg 

oxycodone Q8h for 3 days. She was instructed to follow up with her PCP as well 

as a urologist. 


Date of Discharge: 04/11/18











Minutes to complete discharge: 40





Discharge Summary


Reason For Visit: UTI, PYELONEPHRITIS


Condition: Improved





- Instructions


Diet, Activity, Other Instructions: 


You were admitted for the treatment of your urinary infection with IV 

antibiotics for 3 days. You will need to complete an additional 10 days at home.





We are sending you home on an antibiotic to help fight this infection. You 

should take 400mg of Cefixime once per day for an additional 10 days. Please 

take all of your antibiotics as prescribed for the full time it is prescribed, 

even if you feel better. 





You should follow up with your primary care physician within 1 week of 

discharge home. 





You should follow up with your urologist within one week of discharge home. 

Information for Dr. Lopes, who saw you during your admission, has been 

included in your discharge paperwork. Please call to make an appointment. 





If you begin to experience chest pain, shortness of breath, fevers, chills, 

burning on urination, worsening back pain, trouble urinating or if any of your 

symptoms get worse, please call your doctor or return to the emergency 

department. 





Referrals: 


Christopher Fuller MD [Staff Physician] - 


Jose Paniagua MD [Staff Physician] - 


Disposition: HOME





- Home Medications


Comprehensive Discharge Medication List: 


Ambulatory Orders





Alprazolam [Xanax] 0.5 mg PO TID 04/09/18 


Amlodipine Besylate [Norvasc -] 5 mg PO DAILY 04/09/18 


Aripiprazole [Abilify] 5 mg PO HS 04/09/18 


Atorvastatin Calcium [Lipitor] 20 mg PO HS 04/09/18 


Escitalopram Oxalate [Lexapro -] 20 mg PO DAILY 04/09/18 


Zolpidem Tartrate 10 mg PO HS 04/09/18 


Acetaminophen [Tylenol .Regular Strength -] 650 mg PO Q6H PRN  tablet 04/11/18 


Cefixime [Suprax -] 400 mg PO DAILY #10 capsule 04/11/18 


Docusate Sodium [Colace -] 100 mg PO DAILY  capsule 04/11/18 


Oxycodone HCl 5 mg PO Q12H PRN #4 capsule MDD 2 04/11/18 








This patient is new to me today: No


Emergency Visit: Yes


ED Registration Date: 04/09/18


Care time: The patient presented to the Emergency Department on the above date 

and was hospitalized for further evaluation of their emergent condition.


Critical Care patient: No





- Discharge Referral


Referred to [facility] No

## 2018-05-23 ENCOUNTER — HOSPITAL ENCOUNTER (OUTPATIENT)
Dept: HOSPITAL 74 - FASU | Age: 63
Discharge: HOME | End: 2018-05-23
Attending: ORTHOPAEDIC SURGERY
Payer: COMMERCIAL

## 2018-05-23 VITALS — BODY MASS INDEX: 27.3 KG/M2

## 2018-05-23 VITALS — DIASTOLIC BLOOD PRESSURE: 78 MMHG | SYSTOLIC BLOOD PRESSURE: 145 MMHG | HEART RATE: 68 BPM

## 2018-05-23 VITALS — TEMPERATURE: 97.6 F

## 2018-05-23 DIAGNOSIS — M65.321: Primary | ICD-10-CM

## 2018-05-23 PROCEDURE — 0LN70ZZ RELEASE RIGHT HAND TENDON, OPEN APPROACH: ICD-10-PCS | Performed by: ORTHOPAEDIC SURGERY

## 2018-05-23 NOTE — OP
DATE OF OPERATION:  05/23/2018

 

PREOPERATIVE DIAGNOSIS:  Right index trigger finger.  

 

POSTOPERATIVE DIAGNOSIS:  Right index trigger finger.  

 

OPERATIVE PROCEDURE:  Right index trigger finger release.  

 

ANESTHESIA:  Local sedation.

 

COMPLICATIONS:  None.  

 

ESTIMATED BLOOD LOSS:  Minimal.  

 

INDICATION FOR PROCEDURE:  The patient is a 62-year-old female with the above

findings indicated for operative treatment.  Risks, benefits, and alternatives were

discussed with patient at length, proper informed consent was obtained. 

 

PROCEDURE:  After proper identification of the patient's correct operative site,

patient brought to the operating room, placed supine on the table, all bony

prominences well padded.  Sedation was given by the anesthesiologist, local

anesthesia given 2% lidocaine.  Right upper extremity was prepped and draped in the

usual sterile fashion.  Well padded tourniquet was placed with a sterile prep. 

Esmarch bandage to exsanguinate the right upper extremity.  Tourniquet inflated to

250 mmHg.  A longitudinal incision was made over the A1 pulley to the index finger. 

The incision was made sharp through the skin, with blunt and sharp dissection of

subcutaneous tissues.  A1 pulley was identified and divided longitudinally.  Mild

fraying of the superficialis tendon was noted.  Patient was asked to flex and extend

her finger, and no finger triggering was noted.  Wound was irrigated with saline and

repaired with a 5-0 nylon suture.  A sterile dressing was applied.  The patient was

brought to the recovery room in stable condition.  She tolerated the procedure well. 

 

 

 

DEEDEE GROVE M.D.

 

[id number]

DD: 05/23/2018 16:15

DT: 05/23/2018 19:09

Job #:  [id number]

## 2018-12-17 ENCOUNTER — HOSPITAL ENCOUNTER (INPATIENT)
Dept: HOSPITAL 74 - JER | Age: 63
LOS: 4 days | Discharge: HOME | DRG: 463 | End: 2018-12-21
Admitting: INTERNAL MEDICINE
Payer: COMMERCIAL

## 2018-12-17 VITALS — BODY MASS INDEX: 27.1 KG/M2

## 2018-12-17 DIAGNOSIS — N20.0: ICD-10-CM

## 2018-12-17 DIAGNOSIS — F32.9: ICD-10-CM

## 2018-12-17 DIAGNOSIS — N12: ICD-10-CM

## 2018-12-17 DIAGNOSIS — N39.0: ICD-10-CM

## 2018-12-17 DIAGNOSIS — Z87.440: ICD-10-CM

## 2018-12-17 DIAGNOSIS — N10: Primary | ICD-10-CM

## 2018-12-17 LAB
ALBUMIN SERPL-MCNC: 3.4 G/DL (ref 3.4–5)
ALP SERPL-CCNC: 96 U/L (ref 45–117)
ALT SERPL-CCNC: 30 U/L (ref 13–61)
ANION GAP SERPL CALC-SCNC: 8 MMOL/L (ref 8–16)
APPEARANCE UR: (no result)
AST SERPL-CCNC: 29 U/L (ref 15–37)
BACTERIA #/AREA URNS HPF: (no result) /HPF
BASOPHILS # BLD: 0.3 % (ref 0–2)
BILIRUB SERPL-MCNC: 0.6 MG/DL (ref 0.2–1)
BILIRUB UR STRIP.AUTO-MCNC: NEGATIVE MG/DL
BUN SERPL-MCNC: 11 MG/DL (ref 7–18)
CALCIUM SERPL-MCNC: 8.5 MG/DL (ref 8.5–10.1)
CHLORIDE SERPL-SCNC: 99 MMOL/L (ref 98–107)
CO2 SERPL-SCNC: 26 MMOL/L (ref 21–32)
COLOR UR: YELLOW
CREAT SERPL-MCNC: 0.9 MG/DL (ref 0.55–1.3)
DEPRECATED RDW RBC AUTO: 14.2 % (ref 11.6–15.6)
EOSINOPHIL # BLD: 0.2 % (ref 0–4.5)
EPITH CASTS URNS QL MICRO: (no result) /HPF
GLUCOSE SERPL-MCNC: 151 MG/DL (ref 74–106)
HCT VFR BLD CALC: 35.7 % (ref 32.4–45.2)
HGB BLD-MCNC: 12.2 GM/DL (ref 10.7–15.3)
KETONES UR QL STRIP: NEGATIVE
LEUKOCYTE ESTERASE UR QL STRIP.AUTO: (no result)
LYMPHOCYTES # BLD: 8.9 % (ref 8–40)
MCH RBC QN AUTO: 26.8 PG (ref 25.7–33.7)
MCHC RBC AUTO-ENTMCNC: 34.1 G/DL (ref 32–36)
MCV RBC: 78.6 FL (ref 80–96)
MONOCYTES # BLD AUTO: 10.1 % (ref 3.8–10.2)
MUCOUS THREADS URNS QL MICRO: (no result)
NEUTROPHILS # BLD: 80.5 % (ref 42.8–82.8)
NITRITE UR QL STRIP: NEGATIVE
PH BLDV: 7.41 [PH] (ref 7.32–7.42)
PH UR: 5 [PH] (ref 5–8)
PLATELET # BLD AUTO: 267 K/MM3 (ref 134–434)
PMV BLD: 8.6 FL (ref 7.5–11.1)
POTASSIUM SERPLBLD-SCNC: 4.1 MMOL/L (ref 3.5–5.1)
PROT SERPL-MCNC: 7.5 G/DL (ref 6.4–8.2)
PROT UR QL STRIP: (no result)
PROT UR QL STRIP: NEGATIVE
RBC # BLD AUTO: 4.55 M/MM3 (ref 3.6–5.2)
SODIUM SERPL-SCNC: 133 MMOL/L (ref 136–145)
SP GR UR: 1.01 (ref 1.01–1.03)
UROBILINOGEN UR STRIP-MCNC: NEGATIVE MG/DL (ref 0.2–1)
VENOUS PC02: 36.2 MMHG (ref 38–52)
VENOUS PO2: 66.3 MMHG (ref 28–48)
WBC # BLD AUTO: 10.3 K/MM3 (ref 4–10)

## 2018-12-17 PROCEDURE — C1751 CATH, INF, PER/CENT/MIDLINE: HCPCS

## 2018-12-17 RX ADMIN — HEPARIN SODIUM SCH UNIT: 5000 INJECTION, SOLUTION INTRAVENOUS; SUBCUTANEOUS at 22:50

## 2018-12-17 RX ADMIN — ARIPIPRAZOLE SCH MG: 5 TABLET ORAL at 22:50

## 2018-12-17 RX ADMIN — SODIUM CHLORIDE SCH MLS/HR: 9 INJECTION, SOLUTION INTRAVENOUS at 20:32

## 2018-12-17 NOTE — PDOC
*Physical Exam





- Vital Signs


 Last Vital Signs











Temp Pulse Resp BP Pulse Ox


 


 100.5 F H  79   17   101/55 L  98 


 


 12/17/18 19:09  12/17/18 19:09  12/17/18 19:09  12/17/18 19:09  12/17/18 19:09














- Physical Exam


General Appearance: Yes: Appropriately Dressed


Cardiovascular: positive: Tachycardia


Gastrointestinal/Abdominal: positive: Normal Bowel Sounds, Soft


Musculoskeletal: positive: CVA Tenderness, CVA Tenderness (R)


Integumentary: positive: Diaphoresis, Moist


Neurologic: positive: Fully Oriented, Alert, Normal Mood/Affect





ED Treatment Course





- LABORATORY


CBC & Chemistry Diagram: 


 12/18/18 08:50





 12/18/18 08:50





- ADDITIONAL ORDERS


Additional order review: 


 Laboratory  Results











  12/17/18 12/17/18





  16:35 16:00


 


Sodium  133 L 


 


Potassium  4.1 


 


Chloride  99 


 


Carbon Dioxide  26 


 


Anion Gap  8 


 


BUN  11 


 


Creatinine  0.9 


 


Creat Clearance w eGFR  > 60 


 


Random Glucose  151 H 


 


Calcium  8.5 


 


Total Bilirubin  0.6 


 


AST  29 


 


ALT  30 


 


Alkaline Phosphatase  96 


 


Total Protein  7.5 


 


Albumin  3.4 


 


Urine Color   Yellow


 


Urine Appearance   Slcloudy


 


Urine pH   5.0


 


Ur Specific Gravity   1.012


 


Urine Protein   1+ H


 


Urine Glucose (UA)   Negative


 


Urine Ketones   Negative


 


Urine Blood   Negative


 


Urine Nitrite   Negative


 


Urine Bilirubin   Negative


 


Urine Urobilinogen   Negative


 


Ur Leukocyte Esterase   3+ H


 


Urine WBC (Auto)   337


 


Urine RBC (Auto)   2


 


Ur Epithelial Cells   Rare


 


Urine Bacteria   Rare


 


Urine Mucus   Rare








 











  12/17/18





  16:35


 


RBC  4.55


 


MCV  78.6 L


 


MCHC  34.1


 


RDW  14.2


 


MPV  8.6


 


Neutrophils %  80.5  D


 


Lymphocytes %  8.9  D


 


Monocytes %  10.1


 


Eosinophils %  0.2  D


 


Basophils %  0.3














- Medications


Given in the ED: 


ED Medications














Discontinued Medications














Generic Name Dose Route Start Last Admin





  Trade Name Freq  PRN Reason Stop Dose Admin


 


Acetaminophen  650 mg  12/17/18 17:35  12/17/18 17:56





  Tylenol -  PO  12/17/18 17:36  650 mg





  ONCE ONE   Administration





     





     





     





     


 


Ceftriaxone Sodium 1 gm/  100 mls @ 200 mls/hr  12/17/18 17:36  12/17/18 17:56





  Dextrose  IVPB  12/17/18 18:05  200 mls/hr





  ONCE ONE   Administration





     





     





  Protocol   





     


 


Sodium Chloride  1,000 mls @ 1,000 mls/hr  12/17/18 17:35  12/17/18 17:56





  Normal Saline -  IV  12/17/18 18:34  1,000 mls/hr





  ASDIR STA   Administration





     





     





     





     


 


Ketorolac Tromethamine  30 mg  12/17/18 17:34  12/17/18 17:55





  Toradol Injection -  IVPUSH  12/17/18 17:35  30 mg





  ONCE ONE   Administration





     





     





     





     


 


Ondansetron HCl  4 mg  12/17/18 17:42  12/17/18 17:57





  Zofran Injection  IVPUSH  12/17/18 17:43  4 mg





  ONCE ONE   Administration





     





     





     





     


 


Oxycodone/Acetaminophen  1 combo  12/17/18 19:00  12/17/18 19:09





  Percocet 5/325 -  PO  12/17/18 19:01  1 combo





  ONCE ONE   Administration





     





     





     





     














Medical Decision Making





- Medical Decision Making





12/17/18 19:46


c/o minimal pain relief after percocet/ considering fever and sx consisted with 

pyelonephritis will admit for further management of care. 


12/17/18 20:03


patient signed out to hospitalist for further management of care. 





*DC/Admit/Observation/Transfer


Diagnosis at time of Disposition: 


 Pyelonephritis








- Discharge Dispostion


Disposition: HOME


Condition at time of disposition: Improved


Decision to Admit order: Yes





- Prescriptions





- Referrals





- Patient Instructions





- Post Discharge Activity

## 2018-12-17 NOTE — HP
CHIEF COMPLAINT: fever, back pain 





PCP: Lucila Centeno 





HISTORY OF PRESENT ILLNESS:


64yo woman with history of recurrent UTIs with ESBL Ecoli, renal stones, 

depression /o right sided flank pain, dysuria, fever and chills for the past 2 

days. Symptoms were not improving so she came to ER. In ER patient initially 

received rocephin but added meropenem given her history of  ESBL Ecoli. 





ER course was notable for:


(1) IV antibiotics 


(2) IV fluids 


(3) UA 





Recent Travel: no 





PAST MEDICAL HISTORY: UTIs, depression 





PAST SURGICAL HISTORY: none 





Social History:


Smoking: no 


Alcohol: no 


Drugs:  no 





Family History: none 


Allergies





peach Allergy (Severe, Verified 08/01/18 13:58)


 THROAT CLOSES


morphine Allergy (Intermediate, Verified 08/01/18 13:58)


 Itching


Sulfa (Sulfonamide Antibiotics) Allergy (Mild, Verified 08/01/18 13:58)


 


APPLES Allergy (Severe, Uncoded 08/01/18 13:58)


 


 "THROAT CLOSES" 


CARROTS Allergy (Severe, Uncoded 08/01/18 13:58)


 


 THROAT CLOSES 


STRAWBERRIES Allergy (Severe, Uncoded 08/01/18 13:58)


 


 "THROAT CLOSES" 








HOME MEDICATIONS:


 Home Medications











 Medication  Instructions  Recorded


 


Alprazolam [Xanax] 0.5 mg PO TID 04/09/18


 


Aripiprazole [Abilify] 5 mg PO HS 04/09/18


 


Atorvastatin Calcium [Lipitor] 20 mg PO HS 04/09/18


 


Escitalopram Oxalate [Lexapro -] 20 mg PO DAILY 04/09/18


 


Ciprofloxacin [Cipro -] 500 mg PO Q12H #14 tablet 12/17/18


 


Ibuprofen [Motrin -] 800 mg PO Q6H #30 tablet 12/17/18








REVIEW OF SYSTEMS


CONSTITUTIONAL: 


Absent: malaise, loss of appetite, weight change


Present-   fever, chills, diaphoresis, generalized weakness,


HEENT: 


Absent:  rhinorrhea, nasal congestion, throat pain, throat swelling, difficulty 

swallowing, mouth swelling, ear pain, eye pain, visual changes


CARDIOVASCULAR: 


Absent: chest pain, syncope, palpitations, irregular heart rate, lightheadedness

, peripheral edema


RESPIRATORY: 


Absent: cough, shortness of breath, dyspnea with exertion, orthopnea, wheezing, 

stridor, hemoptysis


GASTROINTESTINAL:


Absent: abdominal pain, abdominal distension, nausea, vomiting, diarrhea, 

constipation, melena, hematochezia


GENITOURINARY: 


Absent:, urgency, hesitancy, hematuria,, genital pain


Present- dysuria, frequency,  flank pain


MUSCULOSKELETAL: 


Absent: myalgia, arthralgia, joint swelling, back pain, neck pain


SKIN: 


Absent: rash, itching, pallor


HEMATOLOGIC/IMMUNOLOGIC: 


Absent: easy bleeding, easy bruising, lymphadenopathy, frequent infections


ENDOCRINE:


Absent: unexplained weight gain, unexplained weight loss, heat intolerance, 

cold intolerance


NEUROLOGIC: 


Absent: headache, focal weakness or paresthesias, dizziness, unsteady gait, 

seizure, mental status changes, bladder or bowel incontinence


PSYCHIATRIC: 


Absent: anxiety, depression, suicidal or homicidal ideation, hallucinations.








PHYSICAL EXAMINATION


 Vital Signs - 24 hr











  12/17/18 12/17/18





  15:44 19:09


 


Temperature 102.5 F H 100.5 F H


 


Pulse Rate 107 H 


 


Pulse Rate [  79





Right]  


 


Respiratory 18 17





Rate  


 


Blood Pressure 130/67 


 


Blood Pressure  101/55 L





[Left]  


 


O2 Sat by Pulse 100 98





Oximetry (%)  











GENERAL: Awake, alert, and fully oriented, in no acute distress.


HEAD: Normal with no signs of trauma.


EYES: Pupils equal, round and reactive to light, extraocular movements intact, 

sclera anicteric, conjunctiva clear. No lid lag.


EARS, NOSE, THROAT: Ears normal, nares patent, oropharynx clear without 

exudates. Moist mucous membranes.


NECK: Normal range of motion, supple without lymphadenopathy, JVD, or masses.


LUNGS: Breath sounds equal, clear to auscultation bilaterally. No wheezes, and 

no crackles. No accessory muscle use.


HEART: Tachycardia, normal S1 and S2 without murmur, rub or gallop.


ABDOMEN: Soft, nontender, not distended, normoactive bowel sounds, no guarding, 

no rebound, no masses.  No hepatomegaly or  splenomegaly. 


MUSCULOSKELETAL: Normal range of motion at all joints. No bony deformities or 

tenderness. Right sided CVA tenderness 


UPPER EXTREMITIES: 2+ pulses, warm, well-perfused. No cyanosis. No clubbing. No 

peripheral edema.


LOWER EXTREMITIES: 2+ pulses, warm, well-perfused. No calf tenderness. No 

peripheral edema. 


NEUROLOGICAL:  Cranial nerves II-XII intact. Normal speech. 


PSYCHIATRIC: Cooperative. Good eye contact. Appropriate mood and affect.


SKIN: Warm, dry, normal turgor, no rashes or lesions noted, normal capillary 

refill. 


 Laboratory Results - last 24 hr











  12/17/18 12/17/18 12/17/18





  16:00 16:35 16:35


 


WBC   10.3 H 


 


RBC   4.55 


 


Hgb   12.2 


 


Hct   35.7 


 


MCV   78.6 L 


 


MCH   26.8 


 


MCHC   34.1 


 


RDW   14.2 


 


Plt Count   267 


 


MPV   8.6 


 


Absolute Neuts (auto)   8.3 H 


 


Neutrophils %   80.5  D 


 


Lymphocytes %   8.9  D 


 


Monocytes %   10.1 


 


Eosinophils %   0.2  D 


 


Basophils %   0.3 


 


Nucleated RBC %   0 


 


Sodium    133 L


 


Potassium    4.1


 


Chloride    99


 


Carbon Dioxide    26


 


Anion Gap    8


 


BUN    11


 


Creatinine    0.9


 


Creat Clearance w eGFR    > 60


 


Random Glucose    151 H


 


Calcium    8.5


 


Total Bilirubin    0.6


 


AST    29


 


ALT    30


 


Alkaline Phosphatase    96


 


Total Protein    7.5


 


Albumin    3.4


 


Urine Color  Yellow  


 


Urine Appearance  Slcloudy  


 


Urine pH  5.0  


 


Ur Specific Gravity  1.012  


 


Urine Protein  1+ H  


 


Urine Glucose (UA)  Negative  


 


Urine Ketones  Negative  


 


Urine Blood  Negative  


 


Urine Nitrite  Negative  


 


Urine Bilirubin  Negative  


 


Urine Urobilinogen  Negative  


 


Ur Leukocyte Esterase  3+ H  


 


Urine WBC (Auto)  337  


 


Urine RBC (Auto)  2  


 


Ur Epithelial Cells  Rare  


 


Urine Bacteria  Rare  


 


Urine Mucus  Rare  








Abdomen/Pelvis CT-reviewed, b/l nonobstructing renal calculi 





ASSESSMENT/PLAN:


#64yo woman with sepsis secondary to right sided pyelonephritis complicated 

with renal calculi b/l. Given history of ESBL ecoli, will cover with meropenem 

instead of ceftriaxone. 


-med/surg 


-blood cultures x2 


-urine culture 


-lactate level 


-follow VS closely 


-ID consult for meropenem approval 


-urology consult for renal stone removal 


-morphine IV for pain control 


-zofran IV prn for nausea/vomiting 


-IV fluid hydration 





#Depression 


-c/w SSRI and Abilify 





#DLP 


-atorvastatin 





-heparin sc for dvt ppx 


-regular diet 








Visit type





- Emergency Visit


Emergency Visit: Yes


ED Registration Date: 12/17/18


Care time: The patient presented to the Emergency Department on the above date 

and was hospitalized for further evaluation of their emergent condition.





- New Patient


This patient is new to me today: Yes


Date on this admission: 12/18/18





- Critical Care


Critical Care patient: No

## 2018-12-17 NOTE — PDOC
Rapid Medical Evaluation


Chief Complaint: Back Pain


Time Seen by Provider: 12/17/18 15:43


Medical Evaluation: 


 Allergies











Allergy/AdvReac Type Severity Reaction Status Date / Time


 


peach Allergy Severe THROAT Verified 08/01/18 13:58





   CLOSES  


 


morphine Allergy Intermediate Itching Verified 08/01/18 13:58


 


Sulfa (Sulfonamide Allergy Mild  Verified 08/01/18 13:58





Antibiotics)     


 


APPLES Allergy Severe  Uncoded 08/01/18 13:58


 


CARROTS Allergy Severe  Uncoded 08/01/18 13:58


 


STRAWBERRIES Allergy Severe  Uncoded 08/01/18 13:58











12/17/18 15:44


I have performed a brief in-person evaluation of this patient.


The patient presents with a chief complaint of:chills fever, 


Pertinent physical exam findings: pale , + CVA


I have ordered the following: CT renal, CbC, CMP, blood culture , IV


The patient will proceed to the ED for further evaluation.


12/17/18 15:48








**Discharge Disposition





- Referrals


Referrals: 


Luicla Gonzalez MD [Primary Care Provider] - 





- Patient Instructions





- Post Discharge Activity

## 2018-12-17 NOTE — PDOC
*Physical Exam





- Vital Signs


 Last Vital Signs











Temp Pulse Resp BP Pulse Ox


 


 97.6 F   63   18   111/53 L  98 


 


 12/17/18 22:04  12/17/18 22:04  12/17/18 22:04  12/17/18 22:04  12/17/18 22:04














ED Treatment Course





- LABORATORY


CBC & Chemistry Diagram: 


 12/17/18 16:35





 12/17/18 16:35





- ADDITIONAL ORDERS


Additional order review: 


 Laboratory  Results











  12/17/18 12/17/18





  16:35 16:00


 


Sodium  133 L 


 


Potassium  4.1 


 


Chloride  99 


 


Carbon Dioxide  26 


 


Anion Gap  8 


 


BUN  11 


 


Creatinine  0.9 


 


Creat Clearance w eGFR  > 60 


 


Random Glucose  151 H 


 


Calcium  8.5 


 


Total Bilirubin  0.6 


 


AST  29 


 


ALT  30 


 


Alkaline Phosphatase  96 


 


Total Protein  7.5 


 


Albumin  3.4 


 


Urine Color   Yellow


 


Urine Appearance   Slcloudy


 


Urine pH   5.0


 


Ur Specific Gravity   1.012


 


Urine Protein   1+ H


 


Urine Glucose (UA)   Negative


 


Urine Ketones   Negative


 


Urine Blood   Negative


 


Urine Nitrite   Negative


 


Urine Bilirubin   Negative


 


Urine Urobilinogen   Negative


 


Ur Leukocyte Esterase   3+ H


 


Urine WBC (Auto)   337


 


Urine RBC (Auto)   2


 


Ur Epithelial Cells   Rare


 


Urine Bacteria   Rare


 


Urine Mucus   Rare








 











  12/17/18





  16:35


 


RBC  4.55


 


MCV  78.6 L


 


MCHC  34.1


 


RDW  14.2


 


MPV  8.6


 


Neutrophils %  80.5  D


 


Lymphocytes %  8.9  D


 


Monocytes %  10.1


 


Eosinophils %  0.2  D


 


Basophils %  0.3














- Medications


Given in the ED: 


ED Medications














Discontinued Medications














Generic Name Dose Route Start Last Admin





  Trade Name Freq  PRN Reason Stop Dose Admin


 


Acetaminophen  650 mg  12/17/18 17:35  12/17/18 17:56





  Tylenol -  PO  12/17/18 17:36  650 mg





  ONCE ONE   Administration





     





     





     





     


 


Ceftriaxone Sodium 1 gm/  100 mls @ 200 mls/hr  12/17/18 17:36  12/17/18 17:56





  Dextrose  IVPB  12/17/18 18:05  200 mls/hr





  ONCE ONE   Administration





     





     





  Protocol   





     


 


Sodium Chloride  1,000 mls @ 1,000 mls/hr  12/17/18 17:35  12/17/18 17:56





  Normal Saline -  IV  12/17/18 18:34  1,000 mls/hr





  ASDIR STA   Administration





     





     





     





     


 


Meropenem 1 gm/ Dextrose  100 mls @ 200 mls/hr  12/17/18 20:45  12/17/18 20:57





  IVPB  12/17/18 21:14  200 mls/hr





  ONCE ONE   Administration





     





     





     





     


 


Ketorolac Tromethamine  30 mg  12/17/18 17:34  12/17/18 17:55





  Toradol Injection -  IVPUSH  12/17/18 17:35  30 mg





  ONCE ONE   Administration





     





     





     





     


 


Ondansetron HCl  4 mg  12/17/18 17:42  12/17/18 17:57





  Zofran Injection  IVPUSH  12/17/18 17:43  4 mg





  ONCE ONE   Administration





     





     





     





     


 


Oxycodone/Acetaminophen  1 combo  12/17/18 19:00  12/17/18 19:09





  Percocet 5/325 -  PO  12/17/18 19:01  1 combo





  ONCE ONE   Administration





     





     





     





     














Medical Decision Making





- Medical Decision Making





12/17/18 22:56


febrile 64 yo female found to have pylonephritis and admitted for IV antibiotics


I agree with the assessment and management of the case





*DC/Admit/Observation/Transfer


Diagnosis at time of Disposition: 


 Pyelonephritis








- Discharge Dispostion


Condition at time of disposition: Unchanged/Unknown





- Prescriptions





- Referrals





- Patient Instructions





- Post Discharge Activity

## 2018-12-17 NOTE — PDOC
History of Present Illness





- General


Chief Complaint: Pain, Acute


Stated Complaint: Cold Symptoms


Time Seen by Provider: 12/17/18 15:43


History Source: Patient





- History of Present Illness


Timing/Duration: reports: getting worse


Abdominal Pain Onset Location: reports: flank





Past History





- Past Medical History


Allergies/Adverse Reactions: 


 Allergies











Allergy/AdvReac Type Severity Reaction Status Date / Time


 


peach Allergy Severe THROAT Verified 08/01/18 13:58





   CLOSES  


 


morphine Allergy Intermediate Itching Verified 08/01/18 13:58


 


Sulfa (Sulfonamide Allergy Mild  Verified 08/01/18 13:58





Antibiotics)     


 


APPLES Allergy Severe  Uncoded 08/01/18 13:58


 


CARROTS Allergy Severe  Uncoded 08/01/18 13:58


 


STRAWBERRIES Allergy Severe  Uncoded 08/01/18 13:58











Home Medications: 


Ambulatory Orders





Alprazolam [Xanax] 0.5 mg PO TID 04/09/18 


Aripiprazole [Abilify] 5 mg PO HS 04/09/18 


Atorvastatin Calcium [Lipitor] 20 mg PO HS 04/09/18 


Escitalopram Oxalate [Lexapro -] 20 mg PO DAILY 04/09/18 


Ciprofloxacin [Cipro -] 500 mg PO Q12H #14 tablet 12/17/18 


Ibuprofen [Motrin -] 800 mg PO Q6H #30 tablet 12/17/18 








Anemia: No


Asthma: Yes


Cancer: No


Cardiac Disorders: No


CVA: No


COPD: No


CHF: No


DVT: No


Dementia: No


Diabetes: No


GI Disorders: Yes (gastritis)


 Disorders: Yes (kidney stones X5 , UTI PRESENT)


HTN: Yes


Hypercholesterolemia: Yes


Kidney Stones: Yes


Liver Disease: No


Seizures: No


Thyroid Disease: Yes





- Surgical History


Abdominal Surgery: Yes (HERNIA)


Appendectomy: Yes


Cardiac Surgery: No


Cholecystectomy: Yes


Lung Surgery: No


Neurologic Surgery: No


Orthopedic Surgery: Yes (CTR RIGHT AND LEFT HAND, LEFT TRIGGER FINGER RELEASE)





- Reproductive History


Is Patient Pregnant Now?: No


Cervical CA: No


Dysfunctional Uterine Bleeding: No


Ectopic Pregnancy: No


Endometrial CA: No


Polycystic Ovaries: No


Tubal Ligation: No





- Immunization History


Immunization Up to Date: No





- Suicide/Smoking/Psychosocial Hx


Smoking History: Never smoked


Have you smoked in the past 12 months: No


Number of Cigarettes Smoked Daily: 0


If you are a former smoker, when did you quit?: 1996


Information on smoking cessation initiated: No


Hx Alcohol Use: No


Drug/Substance Use Hx: No


Substance Use Type: None


Hx Substance Use Treatment: No





**Review of Systems





- Review of Systems


Constitutional: Yes: Chills, Fever


: Yes: Flank Pain.  No: Burning, Dysuria, Discharge, Frequency, Hematuria





*Physical Exam





- Vital Signs


 Last Vital Signs











Temp Pulse Resp BP Pulse Ox


 


 102.5 F H  107 H  18   130/67   100 


 


 12/17/18 15:44  12/17/18 15:44  12/17/18 15:44  12/17/18 15:44  12/17/18 15:44














- Physical Exam


General Appearance: Yes: Appropriately Dressed


HEENT: positive: Normal Voice


Neck: positive: Supple


Gastrointestinal/Abdominal: positive: Soft.  negative: Tender


Musculoskeletal: negative: CVA Tenderness, CVA Tenderness (R)


Integumentary: positive: Dry, Warm


Neurologic: positive: Fully Oriented, Alert, Normal Mood/Affect





Moderate Sedation





- Procedure Monitoring


Vital Signs: 


Procedure Monitoring Vital Signs











Temperature  102.5 F H  12/17/18 15:44


 


Pulse Rate  107 H  12/17/18 15:44


 


Respiratory Rate  18   12/17/18 15:44


 


Blood Pressure  130/67   12/17/18 15:44


 


O2 Sat by Pulse Oximetry (%)  100   12/17/18 15:44











ED Treatment Course





- LABORATORY


CBC & Chemistry Diagram: 


 12/17/18 16:35





 12/17/18 16:35





- ADDITIONAL ORDERS


Additional order review: 


 Laboratory  Results











  12/17/18 12/17/18





  16:35 16:00


 


Sodium  133 L 


 


Potassium  4.1 


 


Chloride  99 


 


Carbon Dioxide  26 


 


Anion Gap  8 


 


BUN  11 


 


Creatinine  0.9 


 


Creat Clearance w eGFR  > 60 


 


Random Glucose  151 H 


 


Calcium  8.5 


 


Total Bilirubin  0.6 


 


AST  29 


 


ALT  30 


 


Alkaline Phosphatase  96 


 


Total Protein  7.5 


 


Albumin  3.4 


 


Urine Color   Yellow


 


Urine Appearance   Slcloudy


 


Urine pH   5.0


 


Ur Specific Gravity   1.012


 


Urine Protein   1+ H


 


Urine Glucose (UA)   Negative


 


Urine Ketones   Negative


 


Urine Blood   Negative


 


Urine Nitrite   Negative


 


Urine Bilirubin   Negative


 


Urine Urobilinogen   Negative


 


Ur Leukocyte Esterase   3+ H


 


Urine WBC (Auto)   337


 


Urine RBC (Auto)   2


 


Ur Epithelial Cells   Rare


 


Urine Bacteria   Rare


 


Urine Mucus   Rare








 











  12/17/18





  16:35


 


RBC  4.55


 


MCV  78.6 L


 


MCHC  34.1


 


RDW  14.2


 


MPV  8.6


 


Neutrophils %  80.5  D


 


Lymphocytes %  8.9  D


 


Monocytes %  10.1


 


Eosinophils %  0.2  D


 


Basophils %  0.3














Medical Decision Making





- Medical Decision Making





12/17/18 17:40


64 yo F, h/o renal stone, utis, here w/ severe R flank pain w/ nausea, fever 

and chills x 3 days. No dysuria or hematuria





See exam





Suspect pyelo


-rocephin


-pain control


-zofran


-antipyretic


-labs


-CT r/o stone





12/17/18 18:12


WBC 10 w. LE and >300 wbc in ua. CT read as b/l non-obs renal stone, no 

ureteral stone and no hydro. Meds in progress. Anticipate dc w/ abx (of note pan

-sen ecoli on prior ucxs)





12/17/18 19:02


Rpt vitals improved. Pt reports persistent pain. Will continue to manage pain 

in ED. Pt states itching w/ morphine but has taken percocet in past w/ no 

adverse rxn





12/17/18 19:03


At this time, signed out to 7pm midlevel pending re-assessment











*DC/Admit/Observation/Transfer


Diagnosis at time of Disposition: 


 Pyelonephritis








- Discharge Dispostion


Condition at time of disposition: Improved





- Prescriptions


Prescriptions: 


Ciprofloxacin [Cipro -] 500 mg PO Q12H #14 tablet


Ibuprofen [Motrin -] 800 mg PO Q6H #30 tablet





- Referrals


Referrals: 


Lucila Gonzalez MD [Primary Care Provider] - 





- Patient Instructions


Additional Instructions: 


You were treated for a kidney infection


Take medications as directed


If symptoms persist or worsen, return to ED


Otherwise, follow with your PMD





- Post Discharge Activity

## 2018-12-18 LAB
ANION GAP SERPL CALC-SCNC: 7 MMOL/L (ref 8–16)
BUN SERPL-MCNC: 10 MG/DL (ref 7–18)
CALCIUM SERPL-MCNC: 8 MG/DL (ref 8.5–10.1)
CHLORIDE SERPL-SCNC: 105 MMOL/L (ref 98–107)
CO2 SERPL-SCNC: 24 MMOL/L (ref 21–32)
CREAT SERPL-MCNC: 0.7 MG/DL (ref 0.55–1.3)
DEPRECATED RDW RBC AUTO: 14.3 % (ref 11.6–15.6)
GLUCOSE SERPL-MCNC: 104 MG/DL (ref 74–106)
HCT VFR BLD CALC: 33 % (ref 32.4–45.2)
HGB BLD-MCNC: 10.7 GM/DL (ref 10.7–15.3)
MCH RBC QN AUTO: 25.6 PG (ref 25.7–33.7)
MCHC RBC AUTO-ENTMCNC: 32.3 G/DL (ref 32–36)
MCV RBC: 79.1 FL (ref 80–96)
PLATELET # BLD AUTO: 228 K/MM3 (ref 134–434)
PMV BLD: 8.2 FL (ref 7.5–11.1)
POTASSIUM SERPLBLD-SCNC: 4.1 MMOL/L (ref 3.5–5.1)
RBC # BLD AUTO: 4.17 M/MM3 (ref 3.6–5.2)
SODIUM SERPL-SCNC: 135 MMOL/L (ref 136–145)
WBC # BLD AUTO: 8.6 K/MM3 (ref 4–10)

## 2018-12-18 RX ADMIN — ARIPIPRAZOLE SCH MG: 5 TABLET ORAL at 21:20

## 2018-12-18 RX ADMIN — HEPARIN SODIUM SCH UNIT: 5000 INJECTION, SOLUTION INTRAVENOUS; SUBCUTANEOUS at 10:10

## 2018-12-18 RX ADMIN — MEROPENEM SCH: 1 INJECTION, POWDER, FOR SOLUTION INTRAVENOUS at 16:53

## 2018-12-18 RX ADMIN — SODIUM CHLORIDE SCH MLS/HR: 9 INJECTION, SOLUTION INTRAVENOUS at 05:11

## 2018-12-18 RX ADMIN — ACETAMINOPHEN PRN MG: 325 TABLET ORAL at 13:40

## 2018-12-18 RX ADMIN — SODIUM CHLORIDE SCH MLS/HR: 9 INJECTION, SOLUTION INTRAVENOUS at 21:18

## 2018-12-18 RX ADMIN — SERTRALINE HYDROCHLORIDE SCH MG: 50 TABLET ORAL at 11:47

## 2018-12-18 RX ADMIN — MEROPENEM SCH MLS/HR: 1 INJECTION, POWDER, FOR SOLUTION INTRAVENOUS at 17:09

## 2018-12-18 RX ADMIN — HEPARIN SODIUM SCH UNIT: 5000 INJECTION, SOLUTION INTRAVENOUS; SUBCUTANEOUS at 21:19

## 2018-12-18 NOTE — CON.ID
Consult


Consult Specialty:: infectious disease


Referred by:: dr zayas


Reason for Consultation:: fever





- History of Present Illness


Chief Complaint: fever


History of Present Illness: 





64 yo female with PMH of nephrlithiasis and recurrent UTI (including ecoli esbl 

in 2017, ecoli in 2018)


now with back pain last week


Friday and Saturday she had chills and tremors


on sunday she had fevers


on monday she came to the ED





no dysuria


+right flank pain





recent lithotripsy this summer


ct scan no obstruction





ua with pyuria


fever to 102.8





no vomiting


no diarrhea


no cough


no travel


no sick contacts











- History Source


History Provided By: Patient, Family Member


Limitations to Obtaining History: No Limitations





- Past Medical History


Pulmonary: Yes: Asthma


Renal/: Yes: Renal Calculi, UTI


...Pregnant: No





- Past Surgical History


Past Surgical History: Yes: Appendectomy, Cataract Removal, Cholecystectomy, 

Hernia Repair, Hysterectomy


Additional Surgical History: parathyroid surgery





- Alcohol/Substance Use


Hx Alcohol Use: No





- Smoking History


Smoking history: Former smoker


Have you smoked in the past 12 months: No


Aproximately how many cigarettes per day: 0


If you are a former smoker, when did you quit?: 1996





- Social History


Usual Living Arrangement: Alone


ADL: Independent


Occupation: not working


Place of Birth: [place] [place]


History of Recent Travel: No





Home Medications





- Allergies


Allergies/Adverse Reactions: 


 Allergies











Allergy/AdvReac Type Severity Reaction Status Date / Time


 


peach Allergy Severe THROAT Verified 08/01/18 13:58





   CLOSES  


 


morphine Allergy Intermediate Itching Verified 08/01/18 13:58


 


Sulfa (Sulfonamide Allergy Mild  Verified 08/01/18 13:58





Antibiotics)     


 


APPLES Allergy Severe  Uncoded 08/01/18 13:58


 


CARROTS Allergy Severe  Uncoded 08/01/18 13:58


 


STRAWBERRIES Allergy Severe  Uncoded 08/01/18 13:58














- Home Medications


Home Medications: 


Ambulatory Orders





Alprazolam [Xanax] 0.5 mg PO TID 04/09/18 


Aripiprazole [Abilify] 5 mg PO HS 04/09/18 


Atorvastatin Calcium [Lipitor] 20 mg PO HS 04/09/18 


Escitalopram Oxalate [Lexapro -] 20 mg PO DAILY 04/09/18 


Ibuprofen [Motrin -] 800 mg PO Q6H #30 tablet 12/17/18 


Sertraline HCl [Zoloft] 100 mg PO DAILY 12/17/18 


Zolpidem Tartrate [Ambien] 10 mg PO HS 12/17/18 











Family Disease History





- Family Disease History


Family Disease History: CA: Grandparent, Father, Mother





Review of Systems





- Review of Systems


Constitutional: reports: Chills, Fever


Eyes: reports: No Symptoms


HENT: reports: No Symptoms.  denies: Difficult Swallowing, Throat Pain


Neck: reports: No Symptoms


Cardiovascular: reports: No Symptoms.  denies: Chest Pain


Respiratory: reports: No Symptoms.  denies: Cough


Gastrointestinal: reports: No Symptoms.  denies: Abdominal Pain


Genitourinary: reports: Flank Pain (right).  denies: Burning, Discharge


Integumentary: denies: Rash


Neurological: reports: No Symptoms





Physical Exam


Vital Signs: 


 Vital Signs











Temperature  100.2 F H  12/18/18 10:00


 


Pulse Rate  99 H  12/18/18 10:00


 


Respiratory Rate  20   12/18/18 10:00


 


Blood Pressure  132/63   12/18/18 10:00


 


O2 Sat by Pulse Oximetry (%)  96   12/18/18 02:29











Constitutional: Yes: Well Nourished, No Distress, Calm


Eyes: Yes: Conjunctiva Clear


HENT: No: Pharyngeal Erythema, Thrush


Neck: Yes: Supple


Cardiovascular: Yes: Regular Rate and Rhythm


Respiratory: Yes: Regular, CTA Bilaterally


Gastrointestinal: Yes: Normal Bowel Sounds, Soft


...Rectal Exam: Yes: Deferred


Renal/: Yes: CVA Tenderness - Right


Edema: No


Neurological: Yes: Alert, Oriented


Labs: 


 CBC, BMP





 12/18/18 08:50 





 12/18/18 08:50 





 Laboratory Tests











  12/17/18





  16:00


 


Ur Leukocyte Esterase  3+ H


 


Urine WBC (Auto)  337


 


Urine RBC (Auto)  2














Imaging





- Results


Cat Scan: Report Reviewed





Problem List





- Problems


(1) Fever


Code(s): R50.9 - FEVER, UNSPECIFIED   





(2) Pyelonephritis


Code(s): N12 - TUBULO-INTERSTITIAL NEPHRITIS, NOT SPCF AS ACUTE OR CHRONIC   





(3) Nephrolithiasis


Code(s): N20.0 - CALCULUS OF KIDNEY   





(4) History of ESBL E. coli infection


Code(s): Z86.19 - PERSONAL HISTORY OF OTHER INFECTIOUS AND PARASITIC DISEASES   





Assessment/Plan





fever


pyuria with flank pain


suspect sepsis due to uti


pyelonephritis


nonobstructing stones on CT scan





prior history of esbl organisms


continue meropenem


f/u cultures





sulfa allergy noted

## 2018-12-18 NOTE — PN
Progress Note (short form)





- Note


Progress Note: 





has pain in flank


dysuria+





 Vital Signs - 24 hr











  12/18/18 12/18/18 12/18/18





  02:27 02:29 05:00


 


Temperature 98.1 F  102.8 F H


 


Pulse Rate 90  97 H


 


Respiratory 20  20





Rate   


 


Blood Pressure 158/86  141/68


 


O2 Sat by Pulse  96 





Oximetry (%)   














  12/18/18 12/18/18 12/18/18





  09:00 10:00 14:19


 


Temperature  100.2 F H 102.2 F H


 


Pulse Rate  99 H 97 H


 


Respiratory 19 20 19





Rate   


 


Blood Pressure  132/63 153/75


 


O2 Sat by Pulse 96  





Oximetry (%)   








 Current Medications











Generic Name Dose Route Start Last Admin





  Trade Name Freq  PRN Reason Stop Dose Admin


 


Acetaminophen  325 mg  12/18/18 04:44  12/18/18 05:12





  Tylenol -  PO   325 mg





  Q6H PRN   Administration





  PAIN LEVEL 6-10   





     





     





     


 


Acetaminophen  650 mg  12/18/18 13:58  12/18/18 13:40





  Tylenol -  PO   650 mg





  Q6H PRN   Administration





  FEVER OVER 101   





     





     





     


 


Alprazolam  0.5 mg  12/18/18 11:02  12/18/18 11:48





  Xanax -  PO   0.5 mg





  Q8H PRN   Administration





  ANXIETY   





     





     





     


 


Aripiprazole  5 mg  12/17/18 22:00  12/18/18 21:20





  Abilify  PO   5 mg





  HS THERESE   Administration





     





     





     





     


 


Heparin Sodium (Porcine)  5,000 unit  12/17/18 22:00  12/18/18 21:19





  Heparin -  SQ   5,000 unit





  BID THERESE   Administration





     





     





     





     


 


Sodium Chloride  1,000 mls @ 150 mls/hr  12/17/18 19:45  12/18/18 21:18





  Normal Saline -  IV   150 mls/hr





  ASDIR THERESE   Administration





     





     





     





     


 


Meropenem 1 gm/ Dextrose  100 mls @ 200 mls/hr  12/18/18 15:15  12/18/18 17:09





  IVPB   200 mls/hr





  Q8H-IV THERESE   Administration





     





     





     





     


 


Ondansetron HCl  4 mg  12/18/18 00:32  





  Zofran Injection  IVPB   





  Q6H PRN   





  NAUSEA   





     





     





     


 


Oxycodone HCl  5 mg  12/18/18 04:44  12/18/18 05:12





  Roxicodone -  PO   5 mg





  Q6H PRN   Administration





  PAIN LEVEL 6-10   





     





     





     


 


Sertraline HCl  100 mg  12/18/18 11:15  12/18/18 11:47





  Zoloft -  PO   100 mg





  DAILY THERESE   Administration





     





     





     





     








 Laboratory Results - last 24 hr











  12/18/18 12/18/18





  08:50 08:50


 


WBC  8.6 


 


RBC  4.17 


 


Hgb  10.7 


 


Hct  33.0 


 


MCV  79.1 L 


 


MCH  25.6 L 


 


MCHC  32.3 


 


RDW  14.3 


 


Plt Count  228 


 


MPV  8.2 


 


Sodium   135 L


 


Potassium   4.1


 


Chloride   105


 


Carbon Dioxide   24


 


Anion Gap   7 L


 


BUN   10


 


Creatinine   0.7


 


Creat Clearance w eGFR   > 60


 


Random Glucose   104


 


Calcium   8.0 L








S1 S2 RRR


Lungs clear


Abd- soft ,tender suprapubic


No edema


tender CVA 





PLAN


iv antibiotics


Blood Cultures negative 


continue with meds


ID eval


 eval 








Problem List





- Problems


(1) Fever


Code(s): R50.9 - FEVER, UNSPECIFIED   





(2) Pyelonephritis


Code(s): N12 - TUBULO-INTERSTITIAL NEPHRITIS, NOT SPCF AS ACUTE OR CHRONIC   





(3) Cystitis


Code(s): N30.90 - CYSTITIS, UNSPECIFIED WITHOUT HEMATURIA

## 2018-12-19 RX ADMIN — MEROPENEM SCH MLS/HR: 1 INJECTION, POWDER, FOR SOLUTION INTRAVENOUS at 17:38

## 2018-12-19 RX ADMIN — SERTRALINE HYDROCHLORIDE SCH MG: 50 TABLET ORAL at 10:00

## 2018-12-19 RX ADMIN — MEROPENEM SCH MLS/HR: 1 INJECTION, POWDER, FOR SOLUTION INTRAVENOUS at 02:45

## 2018-12-19 RX ADMIN — ARIPIPRAZOLE SCH MG: 5 TABLET ORAL at 21:05

## 2018-12-19 RX ADMIN — MEROPENEM SCH MLS/HR: 1 INJECTION, POWDER, FOR SOLUTION INTRAVENOUS at 09:59

## 2018-12-19 RX ADMIN — SODIUM CHLORIDE SCH MLS/HR: 9 INJECTION, SOLUTION INTRAVENOUS at 07:15

## 2018-12-19 RX ADMIN — ACETAMINOPHEN PRN MG: 325 TABLET ORAL at 13:50

## 2018-12-19 RX ADMIN — SODIUM CHLORIDE SCH MLS/HR: 9 INJECTION, SOLUTION INTRAVENOUS at 13:54

## 2018-12-19 RX ADMIN — HEPARIN SODIUM SCH UNIT: 5000 INJECTION, SOLUTION INTRAVENOUS; SUBCUTANEOUS at 21:06

## 2018-12-19 RX ADMIN — HEPARIN SODIUM SCH UNIT: 5000 INJECTION, SOLUTION INTRAVENOUS; SUBCUTANEOUS at 10:00

## 2018-12-19 NOTE — PN
Progress Note (short form)





- Note


Progress Note: 





feels much better


no fevers


headaches resolved


less flank pain





 Vital Signs











 Period  Temp  Pulse  Resp  BP Sys/Negrete  Pulse Ox


 


 Last 24 Hr  97.9 F-102.2 F  69-97  18-19  116-153/61-75  96-96








cor-rrr


lungs clear


abd soft,nt


ext no edema





 CBC, BMP





 12/18/18 08:50 





 12/18/18 08:50 





 Microbiology





12/17/18 16:00   Urine - Urine Clean Catch   Urine Culture - Preliminary


                            Lactose Fermenting Neg Bacilli


12/17/18 20:35   Blood - Peripheral Venous   Blood Culture - Preliminary


                            NO GROWTH OBTAINED AFTER 24 HOURS, INCUBATION TO 

CONTINUE


                            FOR 4 DAYS.


12/17/18 20:35   Blood - Peripheral Venous   Blood Culture - Preliminary


                            NO GROWTH OBTAINED AFTER 24 HOURS, INCUBATION TO 

CONTINUE


                            FOR 4 DAYS.


12/17/18 16:35   Blood - Peripheral Venous   Blood Culture - Preliminary


                            NO GROWTH OBTAINED AFTER 24 HOURS, INCUBATION TO 

CONTINUE


                            FOR 4 DAYS.





a/p


suspected pyelonephritis


history esbl organisms


continue meroopenem


f/u cultures


clinically improved











Problem List





- Problems


(1) Fever


Code(s): R50.9 - FEVER, UNSPECIFIED   





(2) Pyelonephritis


Code(s): N12 - TUBULO-INTERSTITIAL NEPHRITIS, NOT SPCF AS ACUTE OR CHRONIC   





(3) Nephrolithiasis


Code(s): N20.0 - CALCULUS OF KIDNEY   





(4) History of ESBL E. coli infection


Code(s): Z86.19 - PERSONAL HISTORY OF OTHER INFECTIOUS AND PARASITIC DISEASES

## 2018-12-19 NOTE — PN
Progress Note (short form)





- Note


Progress Note: 





decreased pain in flank


Decreased abd pain 


better today 





 Vital Signs - 24 hr











  12/18/18 12/18/18 12/18/18





  18:34 21:00 22:00


 


Temperature 99.2 F  99.9 F H


 


Pulse Rate 82  80


 


Respiratory 18 18 18





Rate   


 


Blood Pressure 125/74  116/61


 


O2 Sat by Pulse  96 





Oximetry (%)   














  12/19/18 12/19/18 12/19/18





  02:00 06:00 09:00


 


Temperature 99.9 F H 97.9 F 


 


Pulse Rate  69 


 


Respiratory  18 18





Rate   


 


Blood Pressure  129/75 


 


O2 Sat by Pulse   96





Oximetry (%)   














  12/19/18 12/19/18





  10:16 14:26


 


Temperature 98.7 F 98.8 F


 


Pulse Rate 73 71


 


Respiratory 18 20





Rate  


 


Blood Pressure 121/73 150/76


 


O2 Sat by Pulse  





Oximetry (%)  








 Current Medications











Generic Name Dose Route Start Last Admin





  Trade Name Freq  PRN Reason Stop Dose Admin


 


Acetaminophen  325 mg  12/18/18 04:44  12/18/18 05:12





  Tylenol -  PO   325 mg





  Q6H PRN   Administration





  PAIN LEVEL 6-10   





     





     





     


 


Acetaminophen  650 mg  12/18/18 13:58  12/19/18 13:50





  Tylenol -  PO   650 mg





  Q6H PRN   Administration





  FEVER OVER 101   





     





     





     


 


Alprazolam  0.5 mg  12/18/18 11:02  12/19/18 10:14





  Xanax -  PO   0.5 mg





  Q8H PRN   Administration





  ANXIETY   





     





     





     


 


Aripiprazole  5 mg  12/17/18 22:00  12/18/18 21:20





  Abilify  PO   5 mg





  HS THERESE   Administration





     





     





     





     


 


Heparin Sodium (Porcine)  5,000 unit  12/17/18 22:00  12/19/18 10:00





  Heparin -  SQ   5,000 unit





  BID THERESE   Administration





     





     





     





     


 


Meropenem 1 gm/ Dextrose  100 mls @ 200 mls/hr  12/18/18 15:15  12/19/18 17:38





  IVPB   200 mls/hr





  Q8H-IV THERESE   Administration





     





     





     





     


 


Sodium Chloride  1,000 mls @ 100 mls/hr  12/19/18 11:54  12/19/18 13:54





  Normal Saline -  IV   100 mls/hr





  ASDIR THERESE   Administration





     





     





     





     


 


Ondansetron HCl  4 mg  12/18/18 00:32  





  Zofran Injection  IVPB   





  Q6H PRN   





  NAUSEA   





     





     





     


 


Oxycodone HCl  5 mg  12/18/18 04:44  12/18/18 05:12





  Roxicodone -  PO   5 mg





  Q6H PRN   Administration





  PAIN LEVEL 6-10   





     





     





     


 


Sertraline HCl  100 mg  12/18/18 11:15  12/19/18 10:00





  Zoloft -  PO   100 mg





  DAILY THERESE   Administration





     





     





     





     











S1 S2 RRR


Lungs clear


Abd- soft ,tender suprapubic


No edema


tender CVA 





PLAN


iv antibiotics


Blood Cultures negative 


continue with meds


ID eval appreciated


 eval 


OOB


xanax prn 





Problem List





- Problems


(1) Fever


Code(s): R50.9 - FEVER, UNSPECIFIED   





(2) Pyelonephritis


Code(s): N12 - TUBULO-INTERSTITIAL NEPHRITIS, NOT SPCF AS ACUTE OR CHRONIC   





(3) Cystitis


Code(s): N30.90 - CYSTITIS, UNSPECIFIED WITHOUT HEMATURIA

## 2018-12-20 LAB
INR BLD: 1.33 (ref 0.83–1.09)
PT PNL PPP: 15.7 SEC (ref 9.7–13)

## 2018-12-20 RX ADMIN — HEPARIN SODIUM SCH UNIT: 5000 INJECTION, SOLUTION INTRAVENOUS; SUBCUTANEOUS at 21:51

## 2018-12-20 RX ADMIN — MEROPENEM SCH MLS/HR: 1 INJECTION, POWDER, FOR SOLUTION INTRAVENOUS at 01:41

## 2018-12-20 RX ADMIN — ACETAMINOPHEN PRN MG: 325 TABLET ORAL at 15:06

## 2018-12-20 RX ADMIN — ERTAPENEM SODIUM SCH MLS/HR: 1 INJECTION, POWDER, LYOPHILIZED, FOR SOLUTION INTRAMUSCULAR; INTRAVENOUS at 17:13

## 2018-12-20 RX ADMIN — SERTRALINE HYDROCHLORIDE SCH MG: 50 TABLET ORAL at 09:32

## 2018-12-20 RX ADMIN — MEROPENEM SCH MLS/HR: 1 INJECTION, POWDER, FOR SOLUTION INTRAVENOUS at 09:32

## 2018-12-20 RX ADMIN — SODIUM CHLORIDE SCH MLS/HR: 9 INJECTION, SOLUTION INTRAVENOUS at 14:52

## 2018-12-20 RX ADMIN — ACETAMINOPHEN PRN MG: 325 TABLET ORAL at 01:40

## 2018-12-20 RX ADMIN — ARIPIPRAZOLE SCH MG: 5 TABLET ORAL at 21:51

## 2018-12-20 RX ADMIN — SODIUM CHLORIDE SCH: 9 INJECTION, SOLUTION INTRAVENOUS at 10:56

## 2018-12-20 RX ADMIN — HEPARIN SODIUM SCH UNIT: 5000 INJECTION, SOLUTION INTRAVENOUS; SUBCUTANEOUS at 09:32

## 2018-12-20 RX ADMIN — SODIUM CHLORIDE SCH MLS/HR: 9 INJECTION, SOLUTION INTRAVENOUS at 05:28

## 2018-12-20 RX ADMIN — ACETAMINOPHEN PRN MG: 325 TABLET ORAL at 09:32

## 2018-12-20 NOTE — PN
Progress Note (short form)





- Note


Progress Note: 





feels much better


no fevers





less flank pain





 Vital Signs











 Period  Temp  Pulse  Resp  BP Sys/Negrete  Pulse Ox


 


 Last 24 Hr  98 F-98.0 F  66-76  18-18  133-135/76-82  95








cor-rrr


lungs clear


abd soft,nt


ext no edema





 CBC, BMP





 12/18/18 08:50 





 12/18/18 08:50 





 Microbiology





12/17/18 16:00   Urine - Urine Clean Catch   Urine Culture - Final


                            Escherichia Coli Esbl 


12/17/18 20:35   Blood - Peripheral Venous   Blood Culture - Preliminary


                            NO GROWTH OBTAINED AFTER 48 HOURS, INCUBATION TO 

CONTINUE


                            FOR 3 DAYS.


12/17/18 20:35   Blood - Peripheral Venous   Blood Culture - Preliminary


                            NO GROWTH OBTAINED AFTER 48 HOURS, INCUBATION TO 

CONTINUE


                            FOR 3 DAYS.


12/17/18 16:35   Blood - Peripheral Venous   Blood Culture - Preliminary


                            NO GROWTH OBTAINED AFTER 48 HOURS, INCUBATION TO 

CONTINUE


                            FOR 3 DAYS.








ct scan no obstruction


 





a/p


 pyelonephritis


history of ephrolithiasis


history esbl organisms


improving


no more fever, less flank pain


switch to ertapenem daily 


day #3 antibiotics


would place picc line and treat total 14 days


urology evaluation pending





contact isolation 





Problem List





- Problems


(1) Fever


Code(s): R50.9 - FEVER, UNSPECIFIED   





(2) Pyelonephritis


Code(s): N12 - TUBULO-INTERSTITIAL NEPHRITIS, NOT SPCF AS ACUTE OR CHRONIC   





(3) Nephrolithiasis


Code(s): N20.0 - CALCULUS OF KIDNEY   





(4) History of ESBL E. coli infection


Code(s): Z86.19 - PERSONAL HISTORY OF OTHER INFECTIOUS AND PARASITIC DISEASES

## 2018-12-20 NOTE — PN
Progress Note (short form)





- Note


Progress Note: 





decreased pain in flank


Decreased abd pain 


better today 


decreased headaches


daughter at bedside





 Vital Signs - 24 hr











  12/19/18 12/20/18 12/20/18





  19:56 06:00 09:00


 


Temperature 98 F 98.0 F 


 


Pulse Rate 72 66 


 


Respiratory 18 18 





Rate   


 


Blood Pressure 133/82 135/76 


 


O2 Sat by Pulse   95





Oximetry (%)   














  12/20/18 12/20/18





  09:04 14:41


 


Temperature 98 F 97.9 F


 


Pulse Rate 76 73


 


Respiratory 18 20





Rate  


 


Blood Pressure 133/80 126/70


 


O2 Sat by Pulse  





Oximetry (%)  








 Current Medications











Generic Name Dose Route Start Last Admin





  Trade Name Freq  PRN Reason Stop Dose Admin


 


Acetaminophen  325 mg  12/18/18 04:44  12/18/18 05:12





  Tylenol -  PO   325 mg





  Q6H PRN   Administration





  PAIN LEVEL 6-10   





     





     





     


 


Acetaminophen  650 mg  12/18/18 13:58  12/20/18 15:06





  Tylenol -  PO   650 mg





  Q6H PRN   Administration





  FEVER OVER 101   





     





     





     


 


Alprazolam  0.5 mg  12/18/18 11:02  12/20/18 09:42





  Xanax -  PO   0.5 mg





  Q8H PRN   Administration





  ANXIETY   





     





     





     


 


Aripiprazole  5 mg  12/17/18 22:00  12/19/18 21:05





  Abilify  PO   5 mg





  HS THERESE   Administration





     





     





     





     


 


Heparin Sodium (Porcine)  5,000 unit  12/17/18 22:00  12/20/18 09:32





  Heparin -  SQ   5,000 unit





  BID THERESE   Administration





     





     





     





     


 


Sodium Chloride  1,000 mls @ 100 mls/hr  12/19/18 11:54  12/20/18 14:52





  Normal Saline -  IV   100 mls/hr





  ASDIR THERESE   Administration





     





     





     





     


 


Ertapenem 1 gm/ Sodium  50 mls @ 100 mls/hr  12/20/18 17:00  





  Chloride  IVPB   





  DAILY THERESE   





     





     





     





     


 


Ondansetron HCl  4 mg  12/18/18 00:32  





  Zofran Injection  IVPB   





  Q6H PRN   





  NAUSEA   





     





     





     


 


Oxycodone HCl  5 mg  12/18/18 04:44  12/18/18 05:12





  Roxicodone -  PO   5 mg





  Q6H PRN   Administration





  PAIN LEVEL 6-10   





     





     





     


 


Sertraline HCl  100 mg  12/18/18 11:15  12/20/18 09:32





  Zoloft -  PO   100 mg





  DAILY THERESE   Administration





     





     





     





     











S1 S2 RRR


Lungs clear


Abd- soft ,no tenderness


no edema








PLAN


iv antibiotics


Blood Cultures negative 


continue with meds


picc line tomorrow


 eval pending 


OOB


xanax prn 





Problem List





- Problems


(1) Fever


Code(s): R50.9 - FEVER, UNSPECIFIED   





(2) Pyelonephritis


Code(s): N12 - TUBULO-INTERSTITIAL NEPHRITIS, NOT SPCF AS ACUTE OR CHRONIC   





(3) Cystitis


Code(s): N30.90 - CYSTITIS, UNSPECIFIED WITHOUT HEMATURIA

## 2018-12-21 VITALS — DIASTOLIC BLOOD PRESSURE: 70 MMHG | HEART RATE: 67 BPM | TEMPERATURE: 97.8 F | SYSTOLIC BLOOD PRESSURE: 132 MMHG

## 2018-12-21 PROCEDURE — 02HV33Z INSERTION OF INFUSION DEVICE INTO SUPERIOR VENA CAVA, PERCUTANEOUS APPROACH: ICD-10-PCS | Performed by: RADIOLOGY

## 2018-12-21 PROCEDURE — B548ZZA ULTRASONOGRAPHY OF SUPERIOR VENA CAVA, GUIDANCE: ICD-10-PCS | Performed by: RADIOLOGY

## 2018-12-21 RX ADMIN — ACETAMINOPHEN PRN MG: 325 TABLET ORAL at 06:00

## 2018-12-21 RX ADMIN — SERTRALINE HYDROCHLORIDE SCH MG: 50 TABLET ORAL at 10:57

## 2018-12-21 RX ADMIN — SODIUM CHLORIDE SCH MLS/HR: 9 INJECTION, SOLUTION INTRAVENOUS at 02:33

## 2018-12-21 RX ADMIN — HEPARIN SODIUM SCH UNIT: 5000 INJECTION, SOLUTION INTRAVENOUS; SUBCUTANEOUS at 10:57

## 2018-12-21 RX ADMIN — ERTAPENEM SODIUM SCH MLS/HR: 1 INJECTION, POWDER, LYOPHILIZED, FOR SOLUTION INTRAMUSCULAR; INTRAVENOUS at 10:57

## 2018-12-21 NOTE — CON.GU
Consult


Consult Specialty:: 


Referred by:: MED


Reason for Consultation:: UATI, kidney stones





- History of Present Illness


Chief Complaint: UTI, kidney stones


History of Present Illness: 





63 year old female with a history of kidney stones who presents with back pain 

and a UTI.  She also has a history of KEERTHI treated with a sling two years ago.  

CT scan shows no obstruction. She has punctate calculi





- History Source


History Provided By: Patient, Medical Record


Limitations to Obtaining History: No Limitations





- Past Medical History


Pulmonary: Yes: Asthma


Renal/: Yes: Renal Calculi, UTI


...Pregnant: No





- Past Surgical History


Past Surgical History: Yes: Appendectomy, Cataract Removal, Cholecystectomy, 

Hernia Repair, Hysterectomy


Additional Surgical History: parathyroid surgery





- Alcohol/Substance Use


Hx Alcohol Use: No





- Smoking History


Smoking history: Former smoker


Have you smoked in the past 12 months: No


Aproximately how many cigarettes per day: 0


If you are a former smoker, when did you quit?: 1996





- Social History


Usual Living Arrangement: Alone


ADL: Independent


Occupation: not working


History of Recent Travel: No





Home Medications





- Allergies


Allergies/Adverse Reactions: 


 Allergies











Allergy/AdvReac Type Severity Reaction Status Date / Time


 


peach Allergy Severe THROAT Verified 08/01/18 13:58





   CLOSES  


 


morphine Allergy Intermediate Itching Verified 08/01/18 13:58


 


Sulfa (Sulfonamide Allergy Mild  Verified 08/01/18 13:58





Antibiotics)     


 


APPLES Allergy Severe  Uncoded 08/01/18 13:58


 


CARROTS Allergy Severe  Uncoded 08/01/18 13:58


 


STRAWBERRIES Allergy Severe  Uncoded 08/01/18 13:58














- Home Medications


Home Medications: 


Ambulatory Orders





Alprazolam [Xanax] 0.5 mg PO TID 04/09/18 


Aripiprazole [Abilify] 5 mg PO HS 04/09/18 


Atorvastatin Calcium [Lipitor] 20 mg PO HS 04/09/18 


Escitalopram Oxalate [Lexapro -] 20 mg PO DAILY 04/09/18 


Ibuprofen [Motrin -] 800 mg PO Q6H #30 tablet 12/17/18 


Sertraline HCl [Zoloft] 100 mg PO DAILY 12/17/18 


Zolpidem Tartrate [Ambien] 10 mg PO HS 12/17/18 











Family Disease History





- Family Disease History


Family Disease History: CA: Grandparent, Father, Mother





Review of Systems





- Review of Systems


Genitourinary: reports: Flank Pain, Frequency


Musculoskeletal: reports: Back Pain





Physical Exam-


Vital Signs: 


 Vital Signs











Temperature  98.7 F   12/21/18 06:00


 


Pulse Rate  73   12/21/18 06:00


 


Respiratory Rate  20   12/21/18 06:00


 


Blood Pressure  149/91   12/21/18 06:00


 


O2 Sat by Pulse Oximetry (%)  95   12/20/18 09:00











Constitutional: Yes: Well Nourished, No Distress, Calm


Renal/: No: Bladder Distention, CVA Tenderness - Left, CVA Tenderness - Right

, [place] Present


Labs: 


 CBC, BMP





 12/18/18 08:50 





 12/18/18 08:50 











Imaging





- Results


Cat Scan: Report Reviewed





Problem List





- Problems


(1) History of ESBL E. coli infection


Assessment/Plan: 


uncomplicated UTI. she is feeling better. no surgical intervention needed at 

this time. continue abx and follow up as outpatient. 


Code(s): Z86.19 - PERSONAL HISTORY OF OTHER INFECTIOUS AND PARASITIC DISEASES   





(2) Nephrolithiasis


Code(s): N20.0 - CALCULUS OF KIDNEY

## 2018-12-21 NOTE — DS
Physical Examination


Vital Signs: 


 Vital Signs











Temperature  98.7 F   12/21/18 06:00


 


Pulse Rate  73   12/21/18 06:00


 


Respiratory Rate  20   12/21/18 06:00


 


Blood Pressure  149/91   12/21/18 06:00


 


O2 Sat by Pulse Oximetry (%)  95   12/20/18 09:00











Constitutional: Yes: No Distress, Calm


Cardiovascular: Yes: Regular Rate and Rhythm


Respiratory: Yes: CTA Bilaterally


Gastrointestinal: Yes: Normal Bowel Sounds, Soft.  No: Tenderness


Edema: No


Labs: 


 CBC, BMP





 12/18/18 08:50 





 12/18/18 08:50 











Discharge Summary


Reason For Visit: FLANK PAIN/PYELONEHRITIS


Current Active Problems





Fever (Acute)


History of ESBL E. coli infection (Acute)


Nephrolithiasis (Acute)


Pyelonephritis (Acute)








Hospital Course: 





Admitted for UTI, pylenonephritis


Seen by ID and Urology


Cultures positive for ESBL


Will be on Ertapenum for 10 more days


Stable for dc home with picc line for iv antibiotics 


Condition: Improved





- Instructions


Diet, Activity, Other Instructions: 


You were treated for a kidney infection


Take medications as directed


If symptoms persist or worsen, return to ED


Otherwise, follow with your PMD


Referrals: 


Lucila Gonzalez MD [Primary Care Provider] - 


Disposition: HOME





- Home Medications


Comprehensive Discharge Medication List: 


Ambulatory Orders





Alprazolam [Xanax] 0.5 mg PO TID 04/09/18 


Aripiprazole [Abilify] 5 mg PO HS 04/09/18 


Atorvastatin Calcium [Lipitor] 20 mg PO HS 04/09/18 


Escitalopram Oxalate [Lexapro -] 20 mg PO DAILY 04/09/18 


Ibuprofen [Motrin -] 800 mg PO Q6H #30 tablet 12/17/18 


Sertraline HCl [Zoloft] 100 mg PO DAILY 12/17/18 


Zolpidem Tartrate [Ambien] 10 mg PO HS 12/17/18 


Ertapenem Sodium [Invanz -] 1 gm IVPB DAILY #10 vial 12/21/18

## 2019-04-28 ENCOUNTER — HOSPITAL ENCOUNTER (EMERGENCY)
Dept: HOSPITAL 74 - JER | Age: 64
Discharge: HOME | End: 2019-04-28
Payer: COMMERCIAL

## 2019-04-28 VITALS — HEART RATE: 60 BPM | TEMPERATURE: 98.7 F | DIASTOLIC BLOOD PRESSURE: 72 MMHG | SYSTOLIC BLOOD PRESSURE: 130 MMHG

## 2019-04-28 VITALS — BODY MASS INDEX: 26.6 KG/M2

## 2019-04-28 DIAGNOSIS — M54.89: Primary | ICD-10-CM

## 2019-04-28 DIAGNOSIS — Z87.442: ICD-10-CM

## 2019-04-28 DIAGNOSIS — I10: ICD-10-CM

## 2019-04-28 LAB
ALBUMIN SERPL-MCNC: 3.7 G/DL (ref 3.4–5)
ALP SERPL-CCNC: 86 U/L (ref 45–117)
ALT SERPL-CCNC: 26 U/L (ref 13–61)
ANION GAP SERPL CALC-SCNC: 7 MMOL/L (ref 8–16)
APPEARANCE UR: CLEAR
AST SERPL-CCNC: 20 U/L (ref 15–37)
BASOPHILS # BLD: 0.9 % (ref 0–2)
BILIRUB SERPL-MCNC: 0.3 MG/DL (ref 0.2–1)
BILIRUB UR STRIP.AUTO-MCNC: NEGATIVE MG/DL
BUN SERPL-MCNC: 19 MG/DL (ref 7–18)
CALCIUM SERPL-MCNC: 9.2 MG/DL (ref 8.5–10.1)
CHLORIDE SERPL-SCNC: 107 MMOL/L (ref 98–107)
CO2 SERPL-SCNC: 26 MMOL/L (ref 21–32)
COLOR UR: YELLOW
CREAT SERPL-MCNC: 0.7 MG/DL (ref 0.55–1.3)
DEPRECATED RDW RBC AUTO: 16.5 % (ref 11.6–15.6)
EOSINOPHIL # BLD: 1.6 % (ref 0–4.5)
GLUCOSE SERPL-MCNC: 100 MG/DL (ref 74–106)
HCT VFR BLD CALC: 38 % (ref 32.4–45.2)
HGB BLD-MCNC: 12.4 GM/DL (ref 10.7–15.3)
KETONES UR QL STRIP: NEGATIVE
LEUKOCYTE ESTERASE UR QL STRIP.AUTO: NEGATIVE
LYMPHOCYTES # BLD: 28.3 % (ref 8–40)
MCH RBC QN AUTO: 25.5 PG (ref 25.7–33.7)
MCHC RBC AUTO-ENTMCNC: 32.6 G/DL (ref 32–36)
MCV RBC: 78.1 FL (ref 80–96)
MONOCYTES # BLD AUTO: 7.6 % (ref 3.8–10.2)
NEUTROPHILS # BLD: 61.6 % (ref 42.8–82.8)
NITRITE UR QL STRIP: NEGATIVE
PH UR: 6.5 [PH] (ref 5–8)
PLATELET # BLD AUTO: 233 K/MM3 (ref 134–434)
PMV BLD: 8.4 FL (ref 7.5–11.1)
POTASSIUM SERPLBLD-SCNC: 4.3 MMOL/L (ref 3.5–5.1)
PROT SERPL-MCNC: 7.4 G/DL (ref 6.4–8.2)
PROT UR QL STRIP: NEGATIVE
PROT UR QL STRIP: NEGATIVE
RBC # BLD AUTO: 4.86 M/MM3 (ref 3.6–5.2)
SODIUM SERPL-SCNC: 140 MMOL/L (ref 136–145)
SP GR UR: 1.01 (ref 1.01–1.03)
UROBILINOGEN UR STRIP-MCNC: 0.2 MG/DL (ref 0.2–1)
WBC # BLD AUTO: 4.9 K/MM3 (ref 4–10)

## 2019-04-28 PROCEDURE — BT43ZZZ ULTRASONOGRAPHY OF BILATERAL KIDNEYS: ICD-10-PCS | Performed by: EMERGENCY MEDICINE

## 2019-04-28 PROCEDURE — 3E0333Z INTRODUCTION OF ANTI-INFLAMMATORY INTO PERIPHERAL VEIN, PERCUTANEOUS APPROACH: ICD-10-PCS | Performed by: EMERGENCY MEDICINE

## 2019-04-28 PROCEDURE — 3E033NZ INTRODUCTION OF ANALGESICS, HYPNOTICS, SEDATIVES INTO PERIPHERAL VEIN, PERCUTANEOUS APPROACH: ICD-10-PCS | Performed by: EMERGENCY MEDICINE

## 2019-04-28 NOTE — PDOC
Attending Attestation





- Resident


Resident Name: Hugo Sena





- ED Attending Attestation


I have performed the following: I have examined & evaluated the patient, The 

case was reviewed & discussed with the resident, I agree w/resident's findings 

& plan, Exceptions are as noted





- HPI


HPI: 





04/28/19 09:58


63-year-old female history of hypertension and previous kidney stones here 

today complaining of right flank pain. Started approximately 1 week ago states 

pain is similar to her previous stones sharp in quality unrelenting she has 

required lithotripsy in the past and had hematuria 1 week ago but has since 

resolved. Denies any fevers chills or . . She does have nausea without 

vomiting. does c/o dysuria. did not take anything for pain, tried THC no relief.





surgical hx: cholecystectomy.hysterectomy











- Physicial Exam


PE: 





04/28/19 10:00


awake alert lungs clear bilaterally heart rrr no mrg. abd soft right mid abd , 

rlq ttp. right cva ttp. skin warm and dry. nuero alert oriented x 3. 


04/28/19 10:32








- Medical Decision Making





04/28/19 10:00


h/o renal colic, here wtih flank pain. differential renal colic, uti pyelo. 

plan focused ED us renal, bladder


pain control labs ua urine culture. reassess. possible CT a/p


04/28/19 10:32


focused ED ultrasound renal


indication right flank pain r/o hydroneprhosis.


bilateral kidneys scanned in two planes.


no hydronephrosis noted.


right kidney diameter 9.79 cm


left kidney diameter 10.97cm


bladder non-distended.





impression :normal renal ultrasaound.





will obtain ct a/p 


04/28/19 13:17


ct negative for utereral stones, no hydro. otherwise unremarkable. ua negative 

for uti. will dc home. d/w dr harman will see pt in office.

## 2019-04-28 NOTE — PDOC
History of Present Illness





- General


Chief Complaint: Pain, Acute


Stated Complaint: KIDNEY STONES


Time Seen by Provider: 04/28/19 09:10





- History of Present Illness


Initial Comments: 





63F w/ a history of HTN and hx of kidney stones 1 week of R flank pain. 

Hematuria that has since resolved. Endorses nausea. Tried marijuana last night 

for pain with little relief.


Denies fevers, dysuria, diarrhea


04/28/19 09:18











Past History





- Past Medical History


Allergies/Adverse Reactions: 


 Allergies











Allergy/AdvReac Type Severity Reaction Status Date / Time


 


peach Allergy Severe THROAT Verified 04/28/19 08:38





   CLOSES  


 


morphine Allergy Intermediate Itching Verified 04/28/19 08:38


 


Sulfa (Sulfonamide Allergy Mild  Verified 04/28/19 08:38





Antibiotics)     


 


APPLES Allergy Severe  Uncoded 04/28/19 08:38


 


CARROTS Allergy Severe  Uncoded 04/28/19 08:38


 


STRAWBERRIES Allergy Severe  Uncoded 04/28/19 08:38











Home Medications: 


Ambulatory Orders





Alprazolam [Xanax] 0.5 mg PO TID 04/09/18 


Aripiprazole [Abilify] 5 mg PO HS 04/09/18 


Atorvastatin Calcium [Lipitor] 20 mg PO HS 04/09/18 


Escitalopram Oxalate [Lexapro -] 20 mg PO DAILY 04/09/18 


Ibuprofen [Motrin -] 800 mg PO Q6H #30 tablet 12/17/18 


Sertraline HCl [Zoloft] 100 mg PO DAILY 12/17/18 


Zolpidem Tartrate [Ambien] 10 mg PO HS 12/17/18 


Ertapenem Sodium [Invanz -] 1 gm IVPB DAILY #10 vial 12/21/18 


Lactobacillus Acidophilus [Bacid -] 1 each PO BID #20 capsule 12/21/18 


Ibuprofen 800 mg PO TID PRN #30 tablet 04/28/19 








Anemia: No


Asthma: Yes


Cancer: No


Cardiac Disorders: No


CVA: No


COPD: No


CHF: No


DVT: No


Dementia: No


Diabetes: No


GI Disorders: Yes (gastritis)


 Disorders: Yes (kidney stones X5 , UTI PRESENT)


HTN: Yes


Hypercholesterolemia: Yes


Kidney Stones: Yes


Liver Disease: No


Seizures: No


Thyroid Disease: Yes





- Surgical History


Abdominal Surgery: Yes (HERNIA)


Appendectomy: Yes


Cardiac Surgery: No


Cholecystectomy: Yes


Lung Surgery: No


Neurologic Surgery: No


Orthopedic Surgery: Yes (CTR RIGHT AND LEFT HAND, LEFT TRIGGER FINGER RELEASE)





- Reproductive History


Cervical CA: No


Dysfunctional Uterine Bleeding: No


Ectopic Pregnancy: No


Endometrial CA: No


Polycystic Ovaries: No


Tubal Ligation: No





- Immunization History


Immunization Up to Date: No





- Suicide/Smoking/Psychosocial Hx


Smoking History: Never smoked


Have you smoked in the past 12 months: No


Number of Cigarettes Smoked Daily: 0


If you are a former smoker, when did you quit?: 1996


Hx Alcohol Use: No


Drug/Substance Use Hx: No


Substance Use Type: None


Hx Substance Use Treatment: No





*Physical Exam





- Vital Signs


 Last Vital Signs











Temp Pulse Resp BP Pulse Ox


 


 97.8 F   74   18   146/85   99 


 


 04/28/19 08:36  04/28/19 08:36  04/28/19 08:36  04/28/19 08:36  04/28/19 08:36














- Physical Exam


Comments: 





GENERAL: Awake, alert, and oriented to person/place/time, in no acute distress


HEAD: No signs of trauma, normocephalic, atraumatic


EYES: PERRLA, EOMI, sclera anicteric, conjunctiva clear


ENT: Hearing grossly normal, nares patent, oropharynx clear without exudates. 

Moist mucosa


LUNGS: No distress, speaks full sentences, clear to auscultation bilaterally


HEART: Regular rate and rhythm, normal S1 and S2, no murmurs appreciated, 

peripheral pulses normal and equal bilaterally


ABDOMEN: Soft, R flank/suprapubic TTP w/o rebound or guarding; +R CVA TTP; 

normoactive bowel sounds


EXTREMITIES: Normal inspection, Normal range of motion, no edema. No clubbing 

or cyanosis


NEUROLOGICAL: Cranial nerves II through XII grossly intact. Normal speech, no 

focal sensorimotor deficits


SKIN: Warm, Dry


04/28/19 09:26








ED Treatment Course





- LABORATORY


CBC & Chemistry Diagram: 


 04/28/19 09:35





 04/28/19 09:35





*DC/Admit/Observation/Transfer


Diagnosis at time of Disposition: 


Back pain


Qualifiers:


 Back pain location: back pain in unspecified location Chronicity: unspecified 

Back pain laterality: right Qualified Code(s): M54.9 - Dorsalgia, unspecified








- Discharge Dispostion


Disposition: HOME


Condition at time of disposition: Stable


Decision to Admit order: No





- Prescriptions


Prescriptions: 


Ibuprofen 800 mg PO TID PRN #30 tablet


 PRN Reason: Pain





- Referrals


Referrals: 


Lucila Gonzalez MD [Primary Care Provider] - 





- Patient Instructions


Printed Discharge Instructions:  DI for Low Back Pain


Additional Instructions: 


You were seen in the Emergency Department for evaluation of back pain. Your 

labs were unremarkable and your CT scan was negative for acute pathology. 

Review the handout provided at discharge. A prescription for Ibuprofen was sent 

to your pharmacy, take as directed. Return to the Emergency Department if you 

develop fevers/chills, blood in your urine, worsening symptoms, or any new/

concerning symptoms.





- Post Discharge Activity

## 2019-11-28 ENCOUNTER — HOSPITAL ENCOUNTER (EMERGENCY)
Dept: HOSPITAL 74 - JER | Age: 64
Discharge: HOME | End: 2019-11-28
Payer: COMMERCIAL

## 2019-11-28 VITALS — DIASTOLIC BLOOD PRESSURE: 82 MMHG | HEART RATE: 66 BPM | SYSTOLIC BLOOD PRESSURE: 160 MMHG | TEMPERATURE: 98.6 F

## 2019-11-28 VITALS — BODY MASS INDEX: 25.3 KG/M2

## 2019-11-28 DIAGNOSIS — Z88.2: ICD-10-CM

## 2019-11-28 DIAGNOSIS — R05: ICD-10-CM

## 2019-11-28 DIAGNOSIS — Z88.6: ICD-10-CM

## 2019-11-28 DIAGNOSIS — J02.9: Primary | ICD-10-CM

## 2019-11-28 DIAGNOSIS — N12: ICD-10-CM

## 2019-11-28 DIAGNOSIS — J45.909: ICD-10-CM

## 2019-11-28 DIAGNOSIS — E78.5: ICD-10-CM

## 2019-11-28 DIAGNOSIS — Z91.018: ICD-10-CM

## 2019-11-28 DIAGNOSIS — N20.0: ICD-10-CM

## 2019-11-28 DIAGNOSIS — I10: ICD-10-CM

## 2019-11-28 LAB
ALBUMIN SERPL-MCNC: 3.5 G/DL (ref 3.4–5)
ALP SERPL-CCNC: 86 U/L (ref 45–117)
ALT SERPL-CCNC: 19 U/L (ref 13–61)
ANION GAP SERPL CALC-SCNC: 5 MMOL/L (ref 8–16)
AST SERPL-CCNC: 21 U/L (ref 15–37)
BASOPHILS # BLD: 0.9 % (ref 0–2)
BILIRUB SERPL-MCNC: 0.1 MG/DL (ref 0.2–1)
BUN SERPL-MCNC: 12.8 MG/DL (ref 7–18)
CALCIUM SERPL-MCNC: 9 MG/DL (ref 8.5–10.1)
CHLORIDE SERPL-SCNC: 105 MMOL/L (ref 98–107)
CO2 SERPL-SCNC: 28 MMOL/L (ref 21–32)
CREAT SERPL-MCNC: 0.6 MG/DL (ref 0.55–1.3)
DEPRECATED RDW RBC AUTO: 13.9 % (ref 11.6–15.6)
EOSINOPHIL # BLD: 3.2 % (ref 0–4.5)
GLUCOSE SERPL-MCNC: 100 MG/DL (ref 74–106)
HCT VFR BLD CALC: 36.7 % (ref 32.4–45.2)
HGB BLD-MCNC: 12.2 GM/DL (ref 10.7–15.3)
LYMPHOCYTES # BLD: 34.3 % (ref 8–40)
MCH RBC QN AUTO: 27.7 PG (ref 25.7–33.7)
MCHC RBC AUTO-ENTMCNC: 33.4 G/DL (ref 32–36)
MCV RBC: 83 FL (ref 80–96)
MONOCYTES # BLD AUTO: 6.7 % (ref 3.8–10.2)
NEUTROPHILS # BLD: 54.9 % (ref 42.8–82.8)
PLATELET # BLD AUTO: 281 K/MM3 (ref 134–434)
PMV BLD: 8.8 FL (ref 7.5–11.1)
POTASSIUM SERPLBLD-SCNC: 4.4 MMOL/L (ref 3.5–5.1)
PROT SERPL-MCNC: 7.1 G/DL (ref 6.4–8.2)
RBC # BLD AUTO: 4.42 M/MM3 (ref 3.6–5.2)
SODIUM SERPL-SCNC: 138 MMOL/L (ref 136–145)
WBC # BLD AUTO: 6.9 K/MM3 (ref 4–10)

## 2019-11-28 PROCEDURE — 3E033GC INTRODUCTION OF OTHER THERAPEUTIC SUBSTANCE INTO PERIPHERAL VEIN, PERCUTANEOUS APPROACH: ICD-10-PCS

## 2019-11-28 PROCEDURE — 3E0F7GC INTRODUCTION OF OTHER THERAPEUTIC SUBSTANCE INTO RESPIRATORY TRACT, VIA NATURAL OR ARTIFICIAL OPENING: ICD-10-PCS

## 2019-11-28 PROCEDURE — 3E0337Z INTRODUCTION OF ELECTROLYTIC AND WATER BALANCE SUBSTANCE INTO PERIPHERAL VEIN, PERCUTANEOUS APPROACH: ICD-10-PCS

## 2019-11-28 NOTE — PDOC
Attending Attestation





- Resident


Resident Name: Chas Uribe





- ED Attending Attestation


I have performed the following: I have examined & evaluated the patient, The 

case was reviewed & discussed with the resident, I agree w/resident's findings 

& plan, Exceptions are as noted





- HPI


HPI: 





11/28/19 18:13





Ms. Gotti is a 65 yo F h/o HTN, HLD, asthma, kidney stones, pylonephritis, 

and recurrent UTI's who presents to the ER with her daughter due to URI 

symptoms which have been present for the past few days


Most significantly, she is coughing (last night could not sleep due to cough)


Cough is productive of whitish green sputum. 


Chest pain is associated with cough


No nausea, vomiting


Daughter has given her Mucinex with minimal relief 


No high fevers, no myalgias or arthralgias


No rash


No dysuria, frequency, or urgency. 


Was seen by PCP two days ago, did not mention this illness


Denies tobacco use





PCP: Lucila Gonzalez


PSH: Tonsillectomy, appendectomy, hysterectomy, carpal tunnel release, hernia 

repair, left trigger finger release


 








- Physicial Exam


PE: 





11/28/19 18:17


GENERAL: The patient is in no acute distress, pharyngitis.


ENT: Ears normal, nares patent, oropharynx clear without exudates.  Moist 

mucous membranes. No tonsillar enlargement, no pharyngeal erythema, exudates


NECK: Normal range of motion, supple, no nuchal rigidity, (+) LAD  


LUNGS: Breath sounds equal, clear to auscultation bilaterally.  No wheezes, and 

no crackles.


HEART: Regular rate and rhythm, normal S1 and S2 without murmur, rub or gallop.


ABDOMEN: Soft, nontender, normoactive bowel sounds.   


EXTREMITIES: Normal range of motion, no edema.   


NEUROLOGICAL: Cranial nerves II through XII grossly intact.  Normal speech.  No 

focal neurological deficits. 


SKIN: Warm, Dry, normal turgor, no rashes or lesions noted.





- Medical Decision Making





11/28/19 18:06


 Laboratory Tests











  11/28/19 11/28/19 11/28/19





  16:40 16:40 16:40


 


WBC  6.9  


 


Hgb  12.2  


 


Hct  36.7  


 


Plt Count  281  D  


 


BUN   12.8 


 


Creatinine   0.6 


 


Troponin I   < 0.02 


 


Influenza A (Rapid)    Negative


 


Influenza B (Rapid)    Negative


 


Group A Strep Rapid   














  11/28/19





  17:33


 


WBC 


 


Hgb 


 


Hct 


 


Plt Count 


 


BUN 


 


Creatinine 


 


Troponin I 


 


Influenza A (Rapid) 


 


Influenza B (Rapid) 


 


Group A Strep Rapid  Negative











11/28/19 18:18


Chest x-ray:


No acute consolidation, pleural effusion,


Will discharge with treatment for upper respiratory infection


11/28/19 18:19


Upon reassessment, patient states that she has some stomach discomfort


Will give Maalox and Pepcid as patient has a history of gastritis


She is currently drinking tea


We will reassess


 





Symptoms have resolved


Will discharge to home


Symptoms improved with neb


Possibly


URI, initial presentation


Reactive airway, initial presentation

## 2019-11-28 NOTE — PDOC
History of Present Illness





- General


Chief Complaint: Shortness of Breath


Stated Complaint: SOB,CHEST PAIN


Time Seen by Provider: 11/28/19 16:18


History Source: Patient


Exam Limitations: No Limitations





- History of Present Illness


Initial Comments: 








Lizbeth Gotti is a 63 yo F w a pmh of HTN, HLD, asthma, kidney stones, 

pylonephritis, and recurrent UTI's who presents to the [facility] er with her 

daughter because she lost her voice and has been coughing incessantly since 

Sunday. The patient states her cough is productive of whitish green sputum. She 

did not receive a flu shot this year. The patient states she has chest pain 

when she coughs but not at rest or with exertion. She denies any nausea, 

vomiting or diaphoresis. She has been taking a good amount of mucinex (

Guafenisin) with minimal relief of her cough. 





Patient denies fevers, chills, abdominal pain, body aches, SOB, difficulty 

breathing, dysuria, frequency, or urgency. 





PCP: Jay Gonzalez


PSH: Tonsillectomy, appendectomy, hysterectomy, carpal tunnel release, hernia 

repair, left trigger finger release


Allergies: Morphine, sulfa, multiple fruits and nuts


Social Hx: Denies smoking, drinking, or other substance usage

















Past History





- Past Medical History


Allergies/Adverse Reactions: 


 Allergies











Allergy/AdvReac Type Severity Reaction Status Date / Time


 


celery Allergy Severe  Verified 11/28/19 15:54


 


peach Allergy Severe THROAT Verified 11/28/19 15:54





   CLOSES  


 


morphine Allergy Intermediate Itching Verified 11/28/19 15:54


 


Sulfa (Sulfonamide Allergy Mild  Verified 11/28/19 15:54





Antibiotics)     


 


APPLES Allergy Severe  Uncoded 11/28/19 15:54


 


CARROTS Allergy Severe  Uncoded 11/28/19 15:54


 


STRAWBERRIES Allergy Severe  Uncoded 11/28/19 15:54











Home Medications: 


Ambulatory Orders





Alprazolam [Xanax] 0.5 mg PO TID 04/09/18 


Aripiprazole [Abilify] 5 mg PO HS 04/09/18 


Zolpidem Tartrate [Ambien] 10 mg PO HS 12/17/18 


Enalapril Maleate [Vasotec -] 10 mg PO DAILY 11/28/19 


Ipratropium 0.02% Nebulizer [Atrovent 0.02% Nebulizer -] 1 neb NEB PRN 5 Days #

10 vial 11/28/19 


Ipratropium 0.02% Nebulizer [Atrovent 0.02% Nebulizer -] 5 amp NEB PRN 5 Days #

5 amp 11/28/19 








Anemia: No


Asthma: Yes


Cancer: No


Cardiac Disorders: No


CVA: No


COPD: No


CHF: No


DVT: No


Dementia: No


Diabetes: No


GI Disorders: Yes (gastritis)


 Disorders: Yes (kidney stones X5 , UTI PRESENT)


HTN: Yes


Hypercholesterolemia: Yes


Kidney Stones: Yes


Liver Disease: No


Seizures: No


Thyroid Disease: Yes





- Surgical History


Abdominal Surgery: Yes (HERNIA)


Appendectomy: Yes


Cardiac Surgery: No


Cholecystectomy: Yes


Lung Surgery: No


Neurologic Surgery: No


Orthopedic Surgery: Yes (CTR RIGHT AND LEFT HAND, LEFT TRIGGER FINGER RELEASE)





- Reproductive History


Cervical CA: No


Dysfunctional Uterine Bleeding: No


Ectopic Pregnancy: No


Endometrial CA: No


Polycystic Ovaries: No


Tubal Ligation: No





- Immunization History


Immunization Up to Date: No





- Psycho Social/Smoking Cessation Hx


Smoking History: Never smoked


Have you smoked in the past 12 months: No


Number of Cigarettes Smoked Daily: 0


If you are a former smoker, when did you quit?: 1996


Information on smoking cessation initiated: No


Hx Alcohol Use: No


Drug/Substance Use Hx: No


Substance Use Type: None


Hx Substance Use Treatment: No





**Review of Systems





- Review of Systems


Able to Perform ROS?: Yes


Comments:: 








CONSTITUTIONAL:


Absent: fever, no chills, no fatigue


EYES:


Absent: visual changes


ENT:


Present: Sore throat


Absent: ear pain


CARDIOVASCULAR:


Absent: chest pain, no palpitations


RESPIRATORY:


Present: Cough


Absent: no SOB


GI:


Absent: abdominal pain, no nausea, no vomiting, no constipation, no diarrhea


GENITOURINARY:


Absent: dysuria, no frequency, no hematuria


MUSKULOSKELETAL:


Absent: back pain, no arthralgia, no myalgia


SKIN:


Absent: rash


NEURO:


Absent: headache











*Physical Exam





- Vital Signs


 Last Vital Signs











Temp Pulse Resp BP Pulse Ox


 


 98.3 F   71   19   110/72   97 


 


 11/28/19 15:40  11/28/19 15:45  11/28/19 15:40  11/28/19 15:40  11/28/19 15:45














- Physical Exam


Comments: 





GENERAL:


Well-appearing, well-nourished. No apparent distress.


HEENT:


+ painless adenopathy. There is no oropharyngeal erythema. Normocephalic, 

atraumatic. PERRL, EOM intact.


CARDIOVASCULAR:


Normal S1, S2. Regular rate and rhythm.


PULMONARY:


No evidence of respiratory distress. Lungs clear to auscultation bilaterally. 

No wheezing, rales or rhonchi.


ABDOMEN:


Soft, non-distended, non-tender.


EXTREMITIES:


Normal ROM in all four extremities. No gross deformities.


SKIN:


Warm, dry.  No rash


NEUROLOGICAL:


No focal neurological deficits.











ED Treatment Course





- LABORATORY


CBC & Chemistry Diagram: 


 11/28/19 16:40





 11/28/19 16:40





- RADIOLOGY


Radiology Studies Ordered: 














 Category Date Time Status


 


 CHEST PA & LAT [RAD] Stat Radiology  11/28/19 16:25 Ordered














Medical Decision Making





- Medical Decision Making








Lizbeth Gotti is a 63 yo F w a pmh of HTN, HLD, asthma, kidney stones, 

pylonephritis, and recurrent UTI's who presents to the [facility] er with her 

daughter because she lost her voice and has been coughing incessantly since 

Sunday. The patient states her cough is productive of whitish green sputum. She 

did not receive a flu shot this year. The patient states she has chest pain 

when she coughs but not at rest or with exertion. She denies any nausea, 

vomiting or diaphoresis. She has been taking a good amount of mucinex (

Guafenisin) with minimal relief of her cough. 





Patient denies fevers, chills, abdominal pain, body aches, SOB, difficulty 

breathing, dysuria, frequency, or urgency. 





Vital Signs











Temp Pulse Resp BP Pulse Ox


 


 98.3 F   71   19   110/72   97 


 


 11/28/19 15:40  11/28/19 15:45  11/28/19 15:40  11/28/19 15:40  11/28/19 15:45











DDx IBNLT: Asthma, upper airway cough syndrome, GERD, URI, bronchitis, 

laryngitis, PNA, ACS/MI, pneumothorax, electrolyte/metabolic disturbance, 

influenza, strep





Plan: Labs, CXR, symptomatic treatment, IV hydration, CXR, re-assess. 





Labs:Unremarkable





CXR: No acute pathology





Re-assessment: Patient feels much better after medications and requests to be 

discharged





Disposition: Home with PCP fu and ipratropium to pharmacy for symtom relief.


- Strict return precautions discussed. 








Discharge





- Discharge Information


Problems reviewed: Yes


Clinical Impression/Diagnosis: 


 Cough, Sore throat





Condition: Improved


Disposition: HOME





- Admission


No





- Additional Discharge Information


Prescriptions: 


Ipratropium 0.02% Nebulizer [Atrovent 0.02% Nebulizer -] 5 amp NEB PRN 5 Days #

5 amp





- Follow up/Referral


Referrals: 


Lucila Gonzalez MD [Primary Care Provider] - 





- Patient Discharge Instructions


Patient Printed Discharge Instructions:  Cough (Alternative Therapy), Cough, 

Guaifenesin


Additional Instructions: 


You came into the ER with a cough and a sore throat. We gave you some 

medications which made you feel better. We are sending these medications to 

your pharmacy. Please make sure to go and pick them up. 





We looked at your blood, did a chest x-ray, and an EKG and found no 

abnormalities.





Please make sure to schedule a follow up appointment with your primary care 

doctor in the next 3 to 5 days to make sure your cough is getting better and 

your throat doesn't hurt anymore. 





Come back to the ER immediately if you experience any worsening of your chest 

pain, a fever, have shortness of breath or any other new or worsening concerns. 





thank you for coming to the [facility] ER. We hope you feel better soon! 


Print Language: ENGLISH





- Post Discharge Activity

## 2019-11-29 NOTE — EKG
Test Reason : 

Blood Pressure : ***/*** mmHG

Vent. Rate : 071 BPM     Atrial Rate : 071 BPM

   P-R Int : 170 ms          QRS Dur : 082 ms

    QT Int : 388 ms       P-R-T Axes : 034 017 034 degrees

   QTc Int : 421 ms

 

NORMAL SINUS RHYTHM

NORMAL ECG

WHEN COMPARED WITH ECG OF 01-AUG-2018 17:29,

LA INTERVAL HAS DECREASED

Confirmed by NANCY YE MD ([id number]) on 11/29/2019 2:18:30 PM

 

Referred By:             Confirmed By:NANCY YE MD

## 2020-09-04 ENCOUNTER — HOSPITAL ENCOUNTER (EMERGENCY)
Dept: HOSPITAL 74 - JER | Age: 65
Discharge: HOME | End: 2020-09-04
Payer: COMMERCIAL

## 2020-09-04 VITALS — HEART RATE: 87 BPM | SYSTOLIC BLOOD PRESSURE: 134 MMHG | DIASTOLIC BLOOD PRESSURE: 80 MMHG | TEMPERATURE: 98.1 F

## 2020-09-04 VITALS — BODY MASS INDEX: 28.2 KG/M2

## 2020-09-04 DIAGNOSIS — S62.102A: Primary | ICD-10-CM

## 2020-09-04 PROCEDURE — 3E0234Z INTRODUCTION OF SERUM, TOXOID AND VACCINE INTO MUSCLE, PERCUTANEOUS APPROACH: ICD-10-PCS

## 2020-09-04 PROCEDURE — 2W3DX1Z IMMOBILIZATION OF LEFT LOWER ARM USING SPLINT: ICD-10-PCS

## 2020-09-04 NOTE — PDOC
*Physical Exam





- Vital Signs


                                Last Vital Signs











Temp Pulse Resp BP Pulse Ox


 


 98.1 F   87   20   134/80   99 


 


 09/04/20 18:36  09/04/20 18:36  09/04/20 18:36  09/04/20 18:36  09/04/20 18:36














ED Treatment Course





- Medications


Given in the ED: 


ED Medications














Discontinued Medications














Generic Name Dose Route Start Last Admin





  Trade Name Carolynn  PRN Reason Stop Dose Admin


 


Acetaminophen  975 mg  09/04/20 17:54  09/04/20 19:25





  Tylenol -  PO  09/04/20 17:55  Not Given





  ONCE ONE  


 


Acetaminophen  650 mg  09/04/20 19:20  09/04/20 19:25





  Tylenol -  PO  09/04/20 19:21  650 mg





  ONCE ONE   Administration


 


Diphtheria/Tetanus/Acell Pertussis  0.5 ml  09/04/20 19:22  09/04/20 19:35





  Boostrix -  IM  09/04/20 19:23  0.5 ml





  .ONCE ONE   Administration


 


Oxycodone/Acetaminophen  1 combo  09/04/20 19:13  09/04/20 19:36





  Percocet 5/325 -  PO  09/04/20 19:14  1 combo





  ONCE ONE   Administration














Discharge





- Discharge Information


Problems reviewed: Yes


Clinical Impression/Diagnosis: 


Wrist fracture, left


Qualifiers:


 Encounter type: initial encounter Fracture type: closed Qualified Code(s): 

S62.102A - Fracture of unspecified carpal bone, left wrist, initial encounter 

for closed fracture





Condition: Stable


Disposition: HOME





- Additional Discharge Information


Prescriptions: 


Ibuprofen [Ibu] 600 mg PO TID 10 Days #30 tablet


Oxycodone HCl/Acetaminophen [Percocet 5-325 mg Tablet] 1 tab PO Q6H #15 tablet 

MDD 3





- Follow up/Referral


Referrals: 


Narciso Cornejo MD [Staff Physician] - 


Dwain Haley MD [Staff Physician] - 


Lucila Gonzalez MD [Primary Care Provider] - 


German Villagomez DO [Staff Physician] - 





- Patient Discharge Instructions


Patient Printed Discharge Instructions:  DI for Wrist Fracture


Additional Instructions: 


YOU MUST FOLLOW UP WITH ORTHOPEDICS





- Post Discharge Activity





Procedures





- Laceration/Wound Repair


  ** Lower Medial Jaw


Wound Length: to 2.5 cm


Wound Explored: clean


Wound's Depth, Shape: linear


Irrigated w/ Saline: Yes


Betadine Prep: Yes


Anesthesia: 1% Lidocaine


Amount of Anesthetic (ccs): 2


Wound Repaired With: Sutures


Suture Size/Type: 5:0, nylon


Number of Sutures: 2


Layer Closure: No


Sterile Dressing Applied: No


Progress: 





Pt tolerated procedure well. Hemostasis achieved.

## 2020-09-04 NOTE — PDOC
History of Present Illness





- General


Chief Complaint: Injury


Stated Complaint: FALL POSSIBLE WRIST FRACTURE


Time Seen by Provider: 09/04/20 17:49


History Source: Patient


Exam Limitations: No Limitations





- History of Present Illness


Initial Comments: 





09/04/20 17:55


64-year-old female past medical history of hypertension depression presented to 

the ED after FOOSH.  Patient states that she was going up the stairs with a 

shopping cart tripped fell and braced her fall with her outstretched left hand. 

Patient states she also hit her head on the stairs with loss of consciousness.  

Patient states she did not syncopize before the fall and it was mechanical in 

nature.  Patient is also complaining of neck pain worse with range of motion.  

Pt otherwise denies: fevers, chills, syncope, lightheadedness, dizziness, 

headaches, changes in vision, changes in hearing, chest pain, shortness of 

breath, palpitations, back pain, abdominal pain, nausea, vomiting, diarrhea, 

constipation, melena. 





Past History





- Medical History


Allergies/Adverse Reactions: 


                                    Allergies











Allergy/AdvReac Type Severity Reaction Status Date / Time


 


celery Allergy Severe  Verified 11/28/19 15:54


 


peach Allergy Severe THROAT Verified 11/28/19 15:54





   CLOSES  


 


morphine Allergy Intermediate Itching Verified 11/28/19 15:54


 


Sulfa (Sulfonamide Allergy Mild  Verified 11/28/19 15:54





Antibiotics)     


 


APPLES Allergy Severe  Uncoded 11/28/19 15:54


 


CARROTS Allergy Severe  Uncoded 11/28/19 15:54


 


STRAWBERRIES Allergy Severe  Uncoded 11/28/19 15:54











Home Medications: 


Ambulatory Orders





Alprazolam [Xanax] 0.5 mg PO TID 04/09/18 


Aripiprazole [Abilify] 5 mg PO HS 04/09/18 


Zolpidem Tartrate [Ambien] 10 mg PO HS 12/17/18 


Enalapril Maleate [Vasotec -] 10 mg PO DAILY 11/28/19 


Ipratropium 0.02% Nebulizer [Atrovent 0.02% Nebulizer -] 1 neb NEB PRN 5 Days 

#10 vial 11/28/19 


Ipratropium 0.02% Nebulizer [Atrovent 0.02% Nebulizer -] 5 amp NEB PRN 5 Days #5

amp 11/28/19 


Ibuprofen [Ibu] 600 mg PO TID 10 Days #30 tablet 09/04/20 


Oxycodone HCl/Acetaminophen [Percocet 5-325 mg Tablet] 1 tab PO Q6H #15 tablet 

MDD 3 09/04/20 








Anemia: No


Asthma: Yes


Cancer: No


Cardiac Disorders: No


CVA: No


COPD: No


CHF: No


DVT: No


Dementia: No


Diabetes: No


GI Disorders: Yes (gastritis)


 Disorders: Yes (kidney stones X5 , UTI PRESENT)


HTN: Yes


Hypercholesterolemia: Yes


Kidney Stones: Yes


Liver Disease: No


Seizures: No


Thyroid Disease: Yes





- Surgical History


Abdominal Surgery: Yes (HERNIA)


Appendectomy: Yes


Cardiac Surgery: No


Cholecystectomy: Yes


Lung Surgery: No


Neurologic Surgery: No


Orthopedic Surgery: Yes (CTR RIGHT AND LEFT HAND, LEFT TRIGGER FINGER RELEASE)





- Reproductive History


Cervical CA: No


Dysfunctional Uterine Bleeding: No


Ectopic Pregnancy: No


Endometrial CA: No


Polycystic Ovaries: No


Tubal Ligation: No





- Immunization History


Immunization Up to Date: No





- Psycho-Social/Smoking History


Smoking History: Never smoked


Have you smoked in the past 12 months: No


Number of Cigarettes Smoked Daily: 0


If you are a former smoker, when did you quit?: 1996





*Physical Exam





- Physical Exam





09/04/20 17:58


Gen: AAOx 3, no acute distress, comfortable, no signs of respiratory distress 


HENT: normocephalic with .5cm laceration to bottom of chin with active bleeding.

Nasal mucosa without erythema. Oropharynx without erythema or exudates. Mucous 

membranes moist. 


EYES: PERRL, EOM intact, conjunctiva pink 


NECK: supple; trachea midline; no JVD, no lymphadenopathy, or thyromegaly


CV: RRR no murmurs, gallops, or rubs.


CHEST: CTA b/l no wheezing, rales or rhonchi


ABD: +BS/ND. no TTP; soft, no rebound, no guarding


EXTREMITY: no cyanosis or erythema. 2+ dorsalis pedis, posterior tibial, and 

radial pulse. No pedal edema; no calf swelling or tenderness 


SKIN: no rash, warm and dry, no diaphoresis. Multiple superficial abrasions to 

bilateral hands without active bleeding


HEME: no purpura or ecchymosis


NEURO: normal speech, CN II-XII intact, sensation intact, normal gait, able to 

tip toe and heel walk, romberg and pronator drift absent, no cerebellar 

deficits, normal finger to nose, heel to shin, rapid alternating movements, no 

tremor, no dysmetria


MS: 5/5 strength in all extremities, FROM intact in all extremities except Left 

Wrist


Left wrist: ttp with swelling and obvious deformity 2+ radial pulse sensation 

intact, dec ROM 2/2 pain, L hand FROM 





09/04/20 21:49








Procedures





- Splinting


Splint Location: Left: Wrist


Pre-Proc Neuro Vasc Exam: normal


Hand-Made Type: orthoglass


Splint Type: Yes: Sugar Tong


Post-Proc Neuro Vasc Exam: normal


Ace Bandage: 3"


Sling: No


Complications: No


Post splint xray: Yes (mild reduction acheived )


Good repositioning: No





ED Treatment Course





- RADIOLOGY


Radiology Studies Ordered: 














 Category Date Time Status


 


 CERVICAL SPINE CT W/O CONTR [CT] Stat CT Scan  09/04/20 17:54 Ordered


 


 HEAD CT WITHOUT CONTRAST [CT] Stat CT Scan  09/04/20 17:53 Ordered


 


 WRIST W/HAND-LEFT* [RAD] Stat Radiology  09/04/20 17:53 Ordered














Medical Decision Making





- Medical Decision Making





09/04/20 18:00


64-year-old female status post fall


VSS





Due to loss of consciousness will obtain CT head and cervical spine without 

contrast as well as x-ray of left wrist and hand


Will update tetanus


Will repair chin laceration with sutures


Will reassess based on results





Tetanus updated


Chin laceration repaired by resident please refer to procedure note


CT head and cervical spine negative for any acute pathology





X-ray left wrist shows acute distal radius fracture with volar displacement


Informed consent was obtained a hematoma block was performed using 1% lidocaine 

without epinephrine the wrist was reduced using closed reduction methods and 

placed in a sugar tong splint


Patient was neurovascularly intact before and after procedure and tolerated the 

procedure well.


Postreduction x-rays still show a displaced comminuted distal radius fracture 

without full close reduction.





Patient to follow-up with orthopedics without fail both Dr. Villagomez and Chantal are

 aware of patient and are willing to see patient in office.





Patient sent 3 days worth of Percocet for pain


 Patient was instructed not to drive or operate heavy machinery while taking 

Percocet.  The patient was also instructed not to take the medication with any 

other sedating medications and not to drink alcohol while taking the medication.





Pt appears well and is safe and stable for discharge with strict return 

precautions including signs and symptoms requring immediate return to the ED





Supportive care instructions explained and given to pt.  Reasons to return 

emergently to ER explained and given. Importance of follow up with PMD and other

 specialists as indicated stressed to pt. Pt verbalized understanding of 

instructions.  Pt to follow up with PMD in 2 days.














Discharge





- Discharge Information


Problems reviewed: Yes


Clinical Impression/Diagnosis: 


Wrist fracture, left


Qualifiers:


 Encounter type: initial encounter Fracture type: closed Qualified Code(s): 

S62.102A - Fracture of unspecified carpal bone, left wrist, initial encounter 

for closed fracture





Condition: Stable


Disposition: HOME





- Additional Discharge Information


Prescriptions: 


Ibuprofen [Ibu] 600 mg PO TID 10 Days #30 tablet


Oxycodone HCl/Acetaminophen [Percocet 5-325 mg Tablet] 1 tab PO Q6H #15 tablet 

MDD 3





- Follow up/Referral


Referrals: 


Lucila Gonzalez MD [Primary Care Provider] - 


Dwain Haley MD [Staff Physician] - 


German Villagomez DO [Staff Physician] - 


Narciso Cornejo MD [Staff Physician] - 





- Patient Discharge Instructions


Patient Printed Discharge Instructions:  DI for Wrist Fracture


Additional Instructions: 


YOU MUST FOLLOW UP WITH ORTHOPEDICS





- Post Discharge Activity

## 2020-09-21 ENCOUNTER — HOSPITAL ENCOUNTER (OUTPATIENT)
Dept: HOSPITAL 74 - FASU | Age: 65
Discharge: HOME | End: 2020-09-21
Attending: ORTHOPAEDIC SURGERY
Payer: COMMERCIAL

## 2020-09-21 VITALS — TEMPERATURE: 97.9 F

## 2020-09-21 VITALS — HEART RATE: 70 BPM | SYSTOLIC BLOOD PRESSURE: 112 MMHG | DIASTOLIC BLOOD PRESSURE: 78 MMHG

## 2020-09-21 VITALS — BODY MASS INDEX: 28.2 KG/M2
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
## 2020-09-21 DIAGNOSIS — S52.572A: Primary | ICD-10-CM
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
## 2020-09-21 PROCEDURE — 0PSJ04Z REPOSITION LEFT RADIUS WITH INTERNAL FIXATION DEVICE, OPEN APPROACH: ICD-10-PCS | Performed by: ORTHOPAEDIC SURGERY

## 2020-09-21 PROCEDURE — 25609 OPTX DST RD XART FX/EP SEP3+: CPT

## 2020-09-21 NOTE — XMS
Summarization Of Episode

                          Created on:2020



Patient:GUILLERMO RAY

Sex:Female

:1955

External Reference #:[id number]





Demographics







                          Address                   [address]



                                                    [place] [address]

 

                          Home Phone                [phone]

 

                          Preferred Language        English

 

                          Marital Status             or 

 

                          Mormonism Affiliation     NO

 

                          Race                      WH

 

                          Ethnic Group               or 









Author







                          Organization              [facility]









Support







                Name            Relationship    Address         Phone

 

                UE, UNEMPLOYED  Unavailable     Unavailable     Unavailable

 

                UE              Unavailable     Unavailable     Unavailable

 

                SAURABH LLANOS DAUGHTER        [address] [phone]



                                                [place] [address] 

 

                SAURABH LLANOS Child           [address] Unavail

able



                                                [place] [address] 









Re-disclosure Warning




Insurance Providers







          Payer name Policy type Policy ID Covered   Covered party's Policy    P

lester



                    / Coverage           party ID  relationship to Mckay    Inf

ormation



                    type                          mckay              

 

          ISAIAH             [id number]           SP                  [id number]

[id number]



          HEALTH NON                                                   



          CAP                                                         

 

          MEDICAID            [id number]            SP                  [id number]

 

          [employer] CARE           [id number]           [id number]                   [id number]

[id number]



          [place]                                                    







Results







                    ID                  Date                Data Source

 

                    [id number]         2020 01:15:00 PM EDT [facility]











          Name      Value     Range     Interpretation Description Data      Sup

porting



                                        Code                Source(s) Document(s

)

 

          SARS                                              [facility]   



          coronavirus 2                                                   



          RNA                                                         









                                        This lab was ordered by [facility] and reported by [facility].











                    ID                  Date                Data Source

 

                    [id number]           2020 12:00:00 AM EDT [facility]











          Name      Value     Range     Interpretation Code Description Data Jeannette

rce(s) Supporting



                                                                      Document(s

)

 

          2019-nCoV                                         [facility]    



          RNA XXX                                                     



          JAIRO+probe-                                                   



          Imp                                                         









                                        This lab was ordered by [facility] CHARLIE schaefer nd reported by [facility].









                                        Procedure

## 2020-09-21 NOTE — OPR
DATE OF SURGERY: 9/21/2020





PREOPERATIVE DIAGNOSIS: Left comminuted intra-articular displaced


distal radius fracture.





POSTOPERATIVE DIAGNOSIS: Left comminuted intra-articular displaced


distal radius fracture.





OPERATIVE PROCEDURE:


1. Open reduction, internal fixation of the left intra-articular


distal radius fracture, three or more fragments


2. Left brachioradialis tenotomy





SURGEON: German Villagomez DO





ASSISTANT: Hu Rivero DO





ANESTHESIA: Regional with sedation





COMPLICATIONS: None





ESTIMATED BLOOD LOSS: Minimal





TOURNIQUET TIME: 55 mins





INDICATION FOR PROCEDURE: The patient is a 64 year old female with the


above findings, indicated for operative treatment. Risks, benefits,


and alternatives were discussed with the patient and her daughter via phone at 

length. Proper


informed consent was obtained.





PROCEDURE: The patient was brought to the operating room and placed on


the operating table in the supine position. The operative upper


extremity was prepped and draped in the usual sterile fashion. The


extremity was exsanguinated with an Esmarch and tourniquet on the


upper arm was inflated.





An incision was made on the volar aspect of the wrist over the distal


flexor carpi radialis tendon. Dissection was taken through the


subcutaneous tissues to the tendon sheath. The tendon sheath was


incised longitudinally and the tendon was retracted. The floor of the


sheath was then incised longitudinally. Distal fascia was also


incised. The pronator was exposed. The fracture was seen disrupting a


portion of the pronator. The pronator was detached radially and


distally and reflected off of the volar surface of the distal radius.


The fracture was then reduced. Manipulation of all fragments produced


excellent reduction of the fracture. A volar distal radial plate was


then applied to the bone and positioned under the image intensifier. 


A K-wire was placed through the plate to secure it to the bone. A


proximal screw was then placed to secure the plate. Distally, locking


screws were placed through the plate into the distal fragment. Each of


these was placed under fluoroscopic guidance. The radial-most screw


was placed at the styloid. The ulnar-most screw captured dorsal ulnar


corner of the distal radius. The K-wire was then removed. Two


additional proximal screws were placed through the plate. Reduction of


the fracture was near anatomic. Position of the plate and all screws


were visualized in multiple projections with the image intensifier.


This provided secure and stable fixation, confirmed radiographically


as well as visually. The distal radial ulnar joints and scapholunate


intervals were stressed and found to be stable. Full range of motion


was achieved in both pronation and supination, as well as flexion and


extension. The wound was irrigated and repaired in layers using 2-0


Vicryl, and 3-0 Nylon sutures. Xeroform and sterile 4x4


dressings were applied, and a volar splint in natural wrist position


was placed. The patient was reversed from anesthesia and brought to


the recovery room in stable condition. She tolerated the procedure


well. Hu Rivero DO was first-assistant during the case as there was


no qualified resident or assistant available. He was integral during


the case for holding reduction while I placed the hardware for


fixation. This would not have been possible without an operative


assistant.





POSTOPERATIVE PLAN:





1. Pain Control


2. Ice/Elevate operative extremity


3. Keep dressings clean/dry at all times


4. Vitamin C 500mg QD PO x 50 days


5. Follow up in my office in 10-14 days for wound check and dressing change.


6. We will have her start physical therapy.





German Villagomez DO

## 2022-08-25 ENCOUNTER — OFFICE (OUTPATIENT)
Dept: URBAN - METROPOLITAN AREA CLINIC 29 | Facility: CLINIC | Age: 67
Setting detail: OPHTHALMOLOGY
End: 2022-08-25
Payer: MEDICAID

## 2022-08-25 DIAGNOSIS — H33.313: ICD-10-CM

## 2022-08-25 DIAGNOSIS — H35.3131: ICD-10-CM

## 2022-08-25 DIAGNOSIS — H26.40: ICD-10-CM

## 2022-08-25 DIAGNOSIS — H25.091: ICD-10-CM

## 2022-08-25 PROCEDURE — 92134 CPTRZ OPH DX IMG PST SGM RTA: CPT | Performed by: OPHTHALMOLOGY

## 2022-08-25 PROCEDURE — 92004 COMPRE OPH EXAM NEW PT 1/>: CPT | Performed by: OPHTHALMOLOGY

## 2022-08-25 ASSESSMENT — REFRACTION_AUTOREFRACTION
OD_AXIS: 1
OS_CYLINDER: +2.50
OS_AXIS: 170
OD_SPHERE: +0.50
OD_CYLINDER: +1.00
OS_SPHERE: -4.00

## 2022-08-25 ASSESSMENT — REFRACTION_MANIFEST
OD_VA1: 20/60
OS_SPHERE: -4.00
OS_CYLINDER: +2.50
OS_VA1: 20/200
OD_CYLINDER: +1.00
OD_SPHERE: -0.50
OS_AXIS: 170
OD_AXIS: 1

## 2022-08-25 ASSESSMENT — VISUAL ACUITY
OD_BCVA: 20/200
OS_BCVA: 20/50-2

## 2022-08-25 ASSESSMENT — SPHEQUIV_DERIVED
OD_SPHEQUIV: 0
OS_SPHEQUIV: -2.75
OS_SPHEQUIV: -2.75
OD_SPHEQUIV: 1

## 2022-08-25 ASSESSMENT — CONFRONTATIONAL VISUAL FIELD TEST (CVF)
OS_FINDINGS: FULL
OD_FINDINGS: FULL

## 2022-08-25 ASSESSMENT — REFRACTION_CURRENTRX
OD_AXIS: 175
OS_CYLINDER: +0.50
OD_OVR_VA: 20/
OD_SPHERE: +2.25
OD_CYLINDER: +1.25
OS_AXIS: 180
OS_SPHERE: +1.25
OS_OVR_VA: 20/

## 2022-09-14 ENCOUNTER — OFFICE (OUTPATIENT)
Dept: URBAN - METROPOLITAN AREA CLINIC 29 | Facility: CLINIC | Age: 67
Setting detail: OPHTHALMOLOGY
End: 2022-09-14
Payer: MEDICAID

## 2022-09-14 ENCOUNTER — RX ONLY (RX ONLY)
Age: 67
End: 2022-09-14

## 2022-09-14 DIAGNOSIS — H26.492: ICD-10-CM

## 2022-09-14 PROCEDURE — 66821 AFTER CATARACT LASER SURGERY: CPT | Performed by: OPHTHALMOLOGY

## 2022-09-14 ASSESSMENT — REFRACTION_AUTOREFRACTION
OD_SPHERE: +0.50
OS_SPHERE: -4.00
OD_AXIS: 1
OS_AXIS: 170
OS_CYLINDER: +2.50
OD_CYLINDER: +1.00

## 2022-09-14 ASSESSMENT — REFRACTION_MANIFEST
OD_SPHERE: -0.50
OS_SPHERE: -4.00
OD_AXIS: 1
OS_AXIS: 170
OD_CYLINDER: +1.00
OS_CYLINDER: +2.50
OS_VA1: 20/200
OD_VA1: 20/60

## 2022-09-14 ASSESSMENT — REFRACTION_CURRENTRX
OS_CYLINDER: +0.50
OS_OVR_VA: 20/
OD_CYLINDER: +1.25
OS_SPHERE: +1.25
OS_AXIS: 180
OD_AXIS: 175
OD_SPHERE: +2.25
OD_OVR_VA: 20/

## 2022-09-14 ASSESSMENT — SPHEQUIV_DERIVED
OS_SPHEQUIV: -2.75
OS_SPHEQUIV: -2.75
OD_SPHEQUIV: 1
OD_SPHEQUIV: 0

## 2022-09-14 ASSESSMENT — CONFRONTATIONAL VISUAL FIELD TEST (CVF)
OS_FINDINGS: FULL
OD_FINDINGS: FULL

## 2022-09-14 ASSESSMENT — VISUAL ACUITY
OS_BCVA: 20/50-1
OD_BCVA: 20/150

## 2022-09-14 ASSESSMENT — TONOMETRY: OS_IOP_MMHG: 18

## 2022-10-10 ENCOUNTER — OFFICE (OUTPATIENT)
Dept: URBAN - METROPOLITAN AREA CLINIC 29 | Facility: CLINIC | Age: 67
Setting detail: OPHTHALMOLOGY
End: 2022-10-10
Payer: MEDICAID

## 2022-10-10 DIAGNOSIS — H35.3131: ICD-10-CM

## 2022-10-10 DIAGNOSIS — H33.313: ICD-10-CM

## 2022-10-10 PROCEDURE — 92201 OPSCPY EXTND RTA DRAW UNI/BI: CPT | Performed by: OPHTHALMOLOGY

## 2022-10-10 PROCEDURE — 92134 CPTRZ OPH DX IMG PST SGM RTA: CPT | Performed by: OPHTHALMOLOGY

## 2022-10-10 PROCEDURE — 99213 OFFICE O/P EST LOW 20 MIN: CPT | Performed by: OPHTHALMOLOGY

## 2022-10-10 ASSESSMENT — CONFRONTATIONAL VISUAL FIELD TEST (CVF)
OD_FINDINGS: FULL
OS_FINDINGS: FULL

## 2022-10-10 ASSESSMENT — REFRACTION_AUTOREFRACTION
OD_SPHERE: +0.50
OS_CYLINDER: +2.50
OD_CYLINDER: +1.00
OS_SPHERE: -4.00
OS_AXIS: 170
OD_AXIS: 1

## 2022-10-10 ASSESSMENT — SPHEQUIV_DERIVED
OD_SPHEQUIV: 1
OD_SPHEQUIV: 0
OS_SPHEQUIV: -2.75
OS_SPHEQUIV: -2.75

## 2022-10-10 ASSESSMENT — REFRACTION_CURRENTRX
OD_SPHERE: +2.25
OS_OVR_VA: 20/
OD_CYLINDER: +1.25
OD_AXIS: 175
OS_AXIS: 180
OS_SPHERE: +1.25
OD_OVR_VA: 20/
OS_CYLINDER: +0.50

## 2022-10-10 ASSESSMENT — REFRACTION_MANIFEST
OD_VA1: 20/60
OS_CYLINDER: +2.50
OS_SPHERE: -4.00
OD_SPHERE: -0.50
OS_VA1: 20/200
OD_AXIS: 1
OS_AXIS: 170
OD_CYLINDER: +1.00

## 2022-10-10 ASSESSMENT — VISUAL ACUITY
OD_BCVA: 20/100+1
OS_BCVA: 20/50-1

## 2022-12-01 ENCOUNTER — OFFICE (OUTPATIENT)
Dept: URBAN - METROPOLITAN AREA CLINIC 29 | Facility: CLINIC | Age: 67
Setting detail: OPHTHALMOLOGY
End: 2022-12-01
Payer: MEDICAID

## 2022-12-01 DIAGNOSIS — H35.372: ICD-10-CM

## 2022-12-01 DIAGNOSIS — H35.3131: ICD-10-CM

## 2022-12-01 DIAGNOSIS — H25.091: ICD-10-CM

## 2022-12-01 DIAGNOSIS — H33.313: ICD-10-CM

## 2022-12-01 PROBLEM — H26.40 POSTERIOR CAPSULE CLOUDING / OPACIFICATION ; LEFT EYE: Status: ACTIVE | Noted: 2022-08-25

## 2022-12-01 PROCEDURE — 92134 CPTRZ OPH DX IMG PST SGM RTA: CPT | Performed by: OPHTHALMOLOGY

## 2022-12-01 PROCEDURE — 92014 COMPRE OPH EXAM EST PT 1/>: CPT | Performed by: OPHTHALMOLOGY

## 2022-12-01 ASSESSMENT — REFRACTION_CURRENTRX
OS_AXIS: 180
OD_CYLINDER: +1.25
OS_OVR_VA: 20/
OS_CYLINDER: +0.50
OD_SPHERE: +2.25
OD_OVR_VA: 20/
OD_AXIS: 175
OS_SPHERE: +1.25

## 2022-12-01 ASSESSMENT — REFRACTION_AUTOREFRACTION
OD_SPHERE: +0.50
OS_SPHERE: -4.00
OD_AXIS: 1
OS_AXIS: 170
OS_CYLINDER: +2.50
OD_CYLINDER: +1.00

## 2022-12-01 ASSESSMENT — SPHEQUIV_DERIVED
OS_SPHEQUIV: -2.75
OS_SPHEQUIV: -2.75
OD_SPHEQUIV: 0
OD_SPHEQUIV: 1

## 2022-12-01 ASSESSMENT — VISUAL ACUITY
OS_BCVA: 20/50-1
OD_BCVA: 20/60

## 2022-12-01 ASSESSMENT — REFRACTION_MANIFEST
OS_AXIS: 170
OD_AXIS: 180
OS_VA1: 20/200
OD_CYLINDER: +1.00
OD_SPHERE: -0.50
OS_SPHERE: -4.00
OD_VA1: 20/60
OS_CYLINDER: +2.50

## 2022-12-01 ASSESSMENT — TONOMETRY
OD_IOP_MMHG: 16
OS_IOP_MMHG: 16

## 2022-12-01 ASSESSMENT — CONFRONTATIONAL VISUAL FIELD TEST (CVF)
OS_FINDINGS: FULL
OD_FINDINGS: FULL